# Patient Record
Sex: FEMALE | Race: WHITE | NOT HISPANIC OR LATINO | Employment: FULL TIME | URBAN - METROPOLITAN AREA
[De-identification: names, ages, dates, MRNs, and addresses within clinical notes are randomized per-mention and may not be internally consistent; named-entity substitution may affect disease eponyms.]

---

## 2017-10-04 ENCOUNTER — TRANSCRIBE ORDERS (OUTPATIENT)
Dept: ADMINISTRATIVE | Facility: HOSPITAL | Age: 57
End: 2017-10-04

## 2017-10-04 DIAGNOSIS — Z12.31 VISIT FOR SCREENING MAMMOGRAM: Primary | ICD-10-CM

## 2017-10-30 ENCOUNTER — HOSPITAL ENCOUNTER (OUTPATIENT)
Dept: MAMMOGRAPHY | Facility: MEDICAL CENTER | Age: 57
Discharge: HOME | End: 2017-10-30
Payer: COMMERCIAL

## 2017-10-30 DIAGNOSIS — Z12.31 VISIT FOR SCREENING MAMMOGRAM: ICD-10-CM

## 2017-10-30 PROCEDURE — G0202 SCR MAMMO BI INCL CAD: HCPCS

## 2019-04-11 ENCOUNTER — TRANSCRIBE ORDERS (OUTPATIENT)
Dept: ADMINISTRATIVE | Facility: HOSPITAL | Age: 59
End: 2019-04-11

## 2019-04-11 DIAGNOSIS — Z12.39 BREAST SCREENING, UNSPECIFIED: Primary | ICD-10-CM

## 2019-05-15 ENCOUNTER — HOSPITAL ENCOUNTER (OUTPATIENT)
Dept: MAMMOGRAPHY | Facility: MEDICAL CENTER | Age: 59
Discharge: HOME | End: 2019-05-15
Payer: COMMERCIAL

## 2019-05-15 VITALS — WEIGHT: 175 LBS | HEIGHT: 69 IN | BODY MASS INDEX: 25.92 KG/M2

## 2019-05-15 DIAGNOSIS — Z12.39 BREAST SCREENING, UNSPECIFIED: ICD-10-CM

## 2019-05-15 PROCEDURE — 77067 SCR MAMMO BI INCL CAD: CPT

## 2019-11-15 ENCOUNTER — OFFICE VISIT (OUTPATIENT)
Dept: CARDIOLOGY CLINIC | Facility: CLINIC | Age: 59
End: 2019-11-15
Payer: COMMERCIAL

## 2019-11-15 VITALS
SYSTOLIC BLOOD PRESSURE: 132 MMHG | HEART RATE: 63 BPM | HEIGHT: 69 IN | WEIGHT: 163.2 LBS | DIASTOLIC BLOOD PRESSURE: 84 MMHG | BODY MASS INDEX: 24.17 KG/M2

## 2019-11-15 DIAGNOSIS — E78.2 MIXED HYPERLIPIDEMIA: ICD-10-CM

## 2019-11-15 DIAGNOSIS — I10 BENIGN ESSENTIAL HYPERTENSION: Primary | ICD-10-CM

## 2019-11-15 PROBLEM — E03.9 HYPOTHYROIDISM: Status: ACTIVE | Noted: 2017-12-19

## 2019-11-15 PROCEDURE — 93000 ELECTROCARDIOGRAM COMPLETE: CPT | Performed by: INTERNAL MEDICINE

## 2019-11-15 PROCEDURE — 99204 OFFICE O/P NEW MOD 45 MIN: CPT | Performed by: INTERNAL MEDICINE

## 2019-11-15 NOTE — PATIENT INSTRUCTIONS
Sodium is probably the 1  Offender for hypertension  Saturated fat is a close 2nd  Caffeine probably less so than the above to causes  I am not opposed to medication discontinuation, but this depends on how aggressive your lifestyle is  If you do go with a more aggressive lifestyle option, and noticed her blood pressures are consistently under 499 systolic, would be reasonable to do a 2 or 3 week holiday off of losartan 50 mg, and reassess your blood pressures

## 2019-11-15 NOTE — PROGRESS NOTES
[facility]  Office Consultation  Geronimo Rivera Del Cid 61 y o  female MRN: [id number]        Problems    1  Benign essential hypertension  POCT ECG   2  Mixed hyperlipidemia         Impression:       Hypercholesterolemia  o She does have hypercholesterolemia, HDL 61, total 223,   o She does have family history of CAD, but not premature   o Normal stress test in 2016 at [facility]   o Atypical symptoms over the years, diagnosed with costochondritis  o ASCVD risk score 4%, does not need statin indications at this time  o Last cholesterol panel was also following an attempt at discontinuation of levothyroxine, which left her severely hypothyroid with a TSH of 17   o I would be interested in her cholesterol panel in the setting of normal thyroid function  o Routine labs typically assessed by her PCP   Hypertension  o She has showed excellent result with aggressive lifestyle modification, using the doctor Christopher Barnett detox approach  However, as is him in nature, she had sheets, this is exposed her to increased saturated fat and sodium, and with that rising blood pressures  o Current blood pressures are acceptable on losartan 50 milligrams  o She is no longer taking amlodipine   o Her EKG is normal, she has no LVH    Plan     Six-month follow-up   From a lifestyle modification standpoint, increased exercise, excellent sodium restriction, ideally with a goal of less than 2000, preferably less than 1500 mg per day, significant reduction in saturated fat, and last [place] if blood pressure still not well controlled, discontinuation of caffeine which is likely least implicated   Will continue to defer follow-up blood work to her PCP who does routinely   No cardiac testing needed for now  Reason for Consult / Principal Problem:  Assess cardiac risk factors    HPI: Deisy Nichols is a 61y o  year old female with a long history of hypertension and hypercholesterolemia    She does have family history of CAD, but not premature, and is estranged from her parents, left home at age 21  He is originally from [facility])  She is here to discuss hypertension primarily, but also addressed risk factors for heart disease, considering she has had atypical chest pain over the years, has had ischemic evaluations because of her family history, and has been told she has costochondritis, and stress related chest pain  Her last test was a stress echo in 2016, reportedly a normal stress test, she had no symptoms during the stress test, and her ejection fraction was normal   She is eager to get off medical therapy, she attempted the doctor Mirela Allred lifestyle approach, with an aggressive detox program which is almost non sustainable, and a did drastically reduce her blood pressure, to the point of hypotension, and the need to discontinue her medical therapy which at that time was losartan 100 milligrams and amlodipine 5 milligrams  Since then, with different periods of lifestyle modification, she has had fluctuations in her blood pressure, currently reasonable but not ideal on 50 milligrams of losartan  Blood pressures seem to be averaging 961-616 systolic  She does try to follow a low-sodium diet, she does try to follow a partial vegan diet        Review of Systems   Constitutional: Negative  HENT: Negative  Eyes: Negative  Respiratory: Negative  Cardiovascular: Positive for chest pain (atypcial)  Gastrointestinal: Negative  Endocrine: Negative  Genitourinary: Negative  Musculoskeletal: Negative  Skin: Negative  Neurological: Negative  Hematological: Negative  Psychiatric/Behavioral: Negative  No past medical history on file  No past surgical history on file    Social History     Substance and Sexual Activity   Alcohol Use Not on file     Social History     Substance and Sexual Activity   Drug Use Not on file     Social History     Tobacco Use   Smoking Status Never Smoker Smokeless Tobacco Never Used     Family History   Problem Relation Age of Onset    No Known Problems Sister        Allergies: Allergies   Allergen Reactions    Oxycodone-Acetaminophen Nausea Only     (Percocet) nauseated, no pain relief    Sulfamethoxazole-Trimethoprim Hives       Medications:     Current Outpatient Medications:     acetaminophen (TYLENOL) 500 mg tablet, Take 2 tablets by mouth, Disp: , Rfl:     ascorbic acid (VITAMIN C) 1000 MG tablet, Take 1,000 mg by mouth daily, Disp: , Rfl:     CALCIUM-MAGNESIUM PO, Take 3 tablets by mouth daily, Disp: , Rfl:     Cholecalciferol 1000 units tablet, Take 2 tablets by mouth daily, Disp: , Rfl:     cyanocobalamin (VITAMIN B-12) 500 mcg tablet, Take 500 mcg by mouth daily, Disp: , Rfl:     DHA-EPA--150-40 MG CAPS, Take by mouth, Disp: , Rfl:     Grape Seed Extract 60 MG CAPS, Take 1 capsule by mouth daily, Disp: , Rfl:     levothyroxine 50 mcg tablet, , Disp: , Rfl:     losartan (COZAAR) 100 MG tablet, , Disp: , Rfl:     Multiple Vitamins-Minerals (MULTIVITAMIN ADULT PO), 1 tab daily, Disp: , Rfl:     Potassium 99 MG TABS, Take 1 tablet by mouth daily, Disp: , Rfl:     Pyridoxine HCl (VITAMIN B-6) 100 MG TABS, Take 1 tablet by mouth daily, Disp: , Rfl:     Red Yeast Rice 600 MG CAPS, Take 1 capsule by mouth daily, Disp: , Rfl:     selenium 200 mcg, Take 200 mcg by mouth daily, Disp: , Rfl:     amLODIPine (NORVASC) 5 mg tablet, TAKE 1 TABLET DAILY, Disp: , Rfl:     loratadine (CLARITIN) 10 mg tablet, Take 10 mg by mouth daily, Disp: , Rfl:       Vitals:    11/15/19 0832   BP: 132/84   Pulse: 63     Weight (last 2 days)     Date/Time   Weight    11/15/19 0832   74 (163 2)            Physical Exam   Constitutional: She is oriented to person, place, and time  She appears well-developed and well-nourished  No distress  HENT:   Head: Normocephalic and atraumatic     Mouth/Throat: Oropharynx is clear and moist    Eyes: Pupils are equal, round, and reactive to light  Conjunctivae and EOM are normal  No scleral icterus  Neck: Normal range of motion  Neck supple  No tracheal deviation present  No thyromegaly present  Cardiovascular: Normal rate, regular rhythm, normal heart sounds and intact distal pulses  Exam reveals no gallop and no friction rub  No murmur heard  Pulmonary/Chest: Effort normal and breath sounds normal  No respiratory distress  She has no wheezes  She has no rales  Abdominal: Soft  Bowel sounds are normal  She exhibits no distension  There is no tenderness  Musculoskeletal: Normal range of motion  She exhibits no edema, tenderness or deformity  Neurological: She is alert and oriented to person, place, and time  No cranial nerve deficit  Skin: Skin is warm and dry  No rash noted  She is not diaphoretic  No erythema  Psychiatric: She has a normal mood and affect  Judgment normal          Laboratory Studies:    Laboratory studies personally reviewed    Cardiac testing:     EKG reviewed personally: Normal Sinus rhythm, normal ECG    Stress ECHO - 2016 - Normal      Fanta Nagel MD    Portions of the record may have been created with voice recognition software  Occasional wrong word or "sound a like" substitutions may have occurred due to the inherent limitations of voice recognition software  Read the chart carefully and recognize, using context, where substitutions have occurred

## 2020-05-15 ENCOUNTER — TELEPHONE (OUTPATIENT)
Dept: CARDIOLOGY CLINIC | Facility: CLINIC | Age: 60
End: 2020-05-15

## 2020-05-15 DIAGNOSIS — E78.2 MIXED HYPERLIPIDEMIA: Primary | ICD-10-CM

## 2020-05-15 DIAGNOSIS — I10 BENIGN ESSENTIAL HYPERTENSION: ICD-10-CM

## 2020-05-26 ENCOUNTER — TELEPHONE (OUTPATIENT)
Dept: CARDIOLOGY CLINIC | Facility: CLINIC | Age: 60
End: 2020-05-26

## 2020-05-27 ENCOUNTER — OFFICE VISIT (OUTPATIENT)
Dept: CARDIOLOGY CLINIC | Facility: CLINIC | Age: 60
End: 2020-05-27
Payer: COMMERCIAL

## 2020-05-27 VITALS
HEIGHT: 69 IN | HEART RATE: 74 BPM | SYSTOLIC BLOOD PRESSURE: 124 MMHG | WEIGHT: 164.9 LBS | DIASTOLIC BLOOD PRESSURE: 78 MMHG | BODY MASS INDEX: 24.42 KG/M2 | TEMPERATURE: 97.8 F

## 2020-05-27 DIAGNOSIS — I10 BENIGN ESSENTIAL HYPERTENSION: ICD-10-CM

## 2020-05-27 DIAGNOSIS — E78.2 MIXED HYPERLIPIDEMIA: Primary | ICD-10-CM

## 2020-05-27 PROCEDURE — 99214 OFFICE O/P EST MOD 30 MIN: CPT | Performed by: INTERNAL MEDICINE

## 2020-06-05 ENCOUNTER — HOSPITAL ENCOUNTER (OUTPATIENT)
Dept: CT IMAGING | Facility: HOSPITAL | Age: 60
Discharge: HOME | End: 2020-06-05
Attending: INTERNAL MEDICINE
Payer: COMMERCIAL

## 2020-06-05 DIAGNOSIS — E78.2 MIXED HYPERLIPIDEMIA: ICD-10-CM

## 2021-03-10 DIAGNOSIS — Z23 ENCOUNTER FOR IMMUNIZATION: ICD-10-CM

## 2021-03-31 ENCOUNTER — IMMUNIZATIONS (OUTPATIENT)
Dept: FAMILY MEDICINE CLINIC | Facility: HOSPITAL | Age: 61
End: 2021-03-31

## 2021-03-31 DIAGNOSIS — Z23 ENCOUNTER FOR IMMUNIZATION: Primary | ICD-10-CM

## 2021-03-31 PROCEDURE — 0001A SARS-COV-2 / COVID-19 MRNA VACCINE (PFIZER-BIONTECH) 30 MCG: CPT

## 2021-03-31 PROCEDURE — 91300 SARS-COV-2 / COVID-19 MRNA VACCINE (PFIZER-BIONTECH) 30 MCG: CPT

## 2021-04-23 ENCOUNTER — IMMUNIZATIONS (OUTPATIENT)
Dept: FAMILY MEDICINE CLINIC | Facility: HOSPITAL | Age: 61
End: 2021-04-23

## 2021-04-23 DIAGNOSIS — Z23 ENCOUNTER FOR IMMUNIZATION: Primary | ICD-10-CM

## 2021-04-23 PROCEDURE — 0002A SARS-COV-2 / COVID-19 MRNA VACCINE (PFIZER-BIONTECH) 30 MCG: CPT

## 2021-04-23 PROCEDURE — 91300 SARS-COV-2 / COVID-19 MRNA VACCINE (PFIZER-BIONTECH) 30 MCG: CPT

## 2021-05-10 ENCOUNTER — OFFICE VISIT (OUTPATIENT)
Dept: CARDIOLOGY CLINIC | Facility: CLINIC | Age: 61
End: 2021-05-10
Payer: COMMERCIAL

## 2021-05-10 VITALS
WEIGHT: 175.7 LBS | SYSTOLIC BLOOD PRESSURE: 130 MMHG | HEIGHT: 69 IN | BODY MASS INDEX: 26.02 KG/M2 | HEART RATE: 66 BPM | DIASTOLIC BLOOD PRESSURE: 82 MMHG

## 2021-05-10 DIAGNOSIS — R00.0 TACHYCARDIA: ICD-10-CM

## 2021-05-10 DIAGNOSIS — I10 BENIGN ESSENTIAL HYPERTENSION: ICD-10-CM

## 2021-05-10 DIAGNOSIS — R53.83 OTHER FATIGUE: ICD-10-CM

## 2021-05-10 DIAGNOSIS — E78.2 MIXED HYPERLIPIDEMIA: Primary | ICD-10-CM

## 2021-05-10 PROCEDURE — 1036F TOBACCO NON-USER: CPT | Performed by: INTERNAL MEDICINE

## 2021-05-10 PROCEDURE — 93000 ELECTROCARDIOGRAM COMPLETE: CPT | Performed by: INTERNAL MEDICINE

## 2021-05-10 PROCEDURE — 99214 OFFICE O/P EST MOD 30 MIN: CPT | Performed by: INTERNAL MEDICINE

## 2021-05-10 PROCEDURE — 3008F BODY MASS INDEX DOCD: CPT | Performed by: INTERNAL MEDICINE

## 2021-05-10 NOTE — PROGRESS NOTES
[facility]  Follow up note  Gemini Lombardi 64 y o  female MRN: [id number]        Problems    1  Mixed hyperlipidemia  Lipid panel   2  Benign essential hypertension  POCT ECG   3  Other fatigue  AMB extended holter monitor   4  Tachycardia         Impression:      tachycardia/fatigue   o Noted on apple watch, especially surrounding exercise, although no specific exertional symptoms  o Noted the changes after getting her 2nd COVID vanc seen   Hypertension   o  under excellent control    hypothyroidism  o  TSH levels have normalized since 2019 when there were us high as 16   Hyperlipidemia  o  traditional ASCVD risk is low at 5%   o Total cholesterol down to 206, LDL down to 123   o Near vegan diet  And primary goal is healthy lifestyle   o Despite coronary calcium of 125, I think it is still reasonable to proceed with lifestyle modification as a primary treatment strategy    Plan:       Proceed with 1 week ZIO patch   Check lipids    Continue annual primary prevention risk assessment visits    HPI:   Gemini Lombardi is a 64y o  year old female without  Premature family history of CAD, hypertension, mild hypercholesterolemia, hypothyroidism, returns to see me mostly with complaints at this time of note in that her apple watch has heart rates up to 170-180 especially with exercise although in the absence of significant exertional symptoms, and noted especially since having her COVID shots  She has fatigue that is out of proportion to her normal fatigue she states  She has been off of her vitamins for the last 1 year  Her blood pressure is excellent  Cholesterol is improved  To 206 for total, 126 for LDL, she continues to strive by healthy lifestyle, walks frequently although not very long distance, and is trying to attain a near  vegan diet  She denies any other significant cardiac symptoms  Review of Systems   Constitutional: Positive for fatigue   Negative for appetite change, diaphoresis and fever  Respiratory: Negative for chest tightness, shortness of breath and wheezing  Cardiovascular: Negative for chest pain, palpitations and leg swelling  Gastrointestinal: Negative for abdominal pain and blood in stool  Musculoskeletal: Negative for arthralgias and joint swelling  Skin: Negative for rash  Neurological: Negative for dizziness, syncope and light-headedness  No past medical history on file  Social History     Substance and Sexual Activity   Alcohol Use None     Social History     Substance and Sexual Activity   Drug Use Not on file     Social History     Tobacco Use   Smoking Status Never Smoker   Smokeless Tobacco Never Used       Allergies:   Allergies   Allergen Reactions    Oxycodone-Acetaminophen Nausea Only     (Percocet) nauseated, no pain relief    Sulfamethoxazole-Trimethoprim Hives       Medications:     Current Outpatient Medications:     ascorbic acid (VITAMIN C) 1000 MG tablet, Take 1,000 mg by mouth daily, Disp: , Rfl:     CALCIUM-MAGNESIUM PO, Take 3 tablets by mouth daily, Disp: , Rfl:     Cholecalciferol 1000 units tablet, Take 2 tablets by mouth daily, Disp: , Rfl:     cyanocobalamin (VITAMIN B-12) 500 mcg tablet, Take 500 mcg by mouth daily, Disp: , Rfl:     Grape Seed Extract 60 MG CAPS, Take 1 capsule by mouth daily, Disp: , Rfl:     IODINE-VITAMIN A PO, , Disp: , Rfl:     levothyroxine 50 mcg tablet, , Disp: , Rfl:     losartan (COZAAR) 100 MG tablet, Take 50 mg by mouth daily, Disp: , Rfl:     Potassium 99 MG TABS, Take 1 tablet by mouth daily, Disp: , Rfl:     Pyridoxine HCl (VITAMIN B-6) 100 MG TABS, Take 1 tablet by mouth daily, Disp: , Rfl:     Red Yeast Rice 600 MG CAPS, Take 1 capsule by mouth daily, Disp: , Rfl:     selenium 200 mcg, Take 200 mcg by mouth daily, Disp: , Rfl:       Vitals:    05/10/21 0942   BP: 130/82   Pulse: 66     Weight (last 2 days)     Date/Time   Weight    05/10/21 0942   79 7 (175 7)            Physical Exam  Constitutional:       General: She is not in acute distress  Appearance: She is not diaphoretic  HENT:      Head: Normocephalic and atraumatic  Eyes:      General: No scleral icterus  Conjunctiva/sclera: Conjunctivae normal    Neck:      Musculoskeletal: Normal range of motion  Vascular: No JVD  Cardiovascular:      Rate and Rhythm: Normal rate and regular rhythm  Heart sounds: Normal heart sounds  No murmur  Pulmonary:      Effort: Pulmonary effort is normal  No respiratory distress  Breath sounds: Normal breath sounds  No wheezing, rhonchi or rales  Musculoskeletal:         General: No tenderness  Right lower leg: Normal  No edema  Left lower leg: Normal  No edema  Skin:     General: Skin is warm and dry  Laboratory Studies:     all laboratory studies personally reviewed    Cardiac testing:     EKG reviewed personally:    11/19-normal sinus rhythm, normal EKG  Results for orders placed or performed in visit on 05/10/21   POCT ECG    Impression    Normal sinus rhythm, normal EKG          stress test  [facility] 2016-normal stress echo      Suzi Ortega MD    Portions of the record may have been created with voice recognition software  Occasional wrong word or "sound a like" substitutions may have occurred due to the inherent limitations of voice recognition software  Read the chart carefully and recognize, using context, where substitutions have occurred

## 2021-05-12 ENCOUNTER — APPOINTMENT (OUTPATIENT)
Dept: LAB | Facility: CLINIC | Age: 61
End: 2021-05-12
Payer: COMMERCIAL

## 2021-05-12 DIAGNOSIS — E78.2 MIXED HYPERLIPIDEMIA: ICD-10-CM

## 2021-05-12 LAB
ALBUMIN SERPL BCP-MCNC: 3.8 G/DL (ref 3.5–5)
ALP SERPL-CCNC: 60 U/L (ref 46–116)
ALT SERPL W P-5'-P-CCNC: 21 U/L (ref 12–78)
ANION GAP SERPL CALCULATED.3IONS-SCNC: 5 MMOL/L (ref 4–13)
AST SERPL W P-5'-P-CCNC: 14 U/L (ref 5–45)
BILIRUB SERPL-MCNC: 0.62 MG/DL (ref 0.2–1)
BUN SERPL-MCNC: 11 MG/DL (ref 5–25)
CALCIUM SERPL-MCNC: 9.6 MG/DL (ref 8.3–10.1)
CHLORIDE SERPL-SCNC: 111 MMOL/L (ref 100–108)
CHOLEST SERPL-MCNC: 202 MG/DL (ref 50–200)
CO2 SERPL-SCNC: 25 MMOL/L (ref 21–32)
CREAT SERPL-MCNC: 0.64 MG/DL (ref 0.6–1.3)
GFR SERPL CREATININE-BSD FRML MDRD: 97 ML/MIN/1.73SQ M
GLUCOSE P FAST SERPL-MCNC: 100 MG/DL (ref 65–99)
HDLC SERPL-MCNC: 54 MG/DL
LDLC SERPL CALC-MCNC: 121 MG/DL (ref 0–100)
POTASSIUM SERPL-SCNC: 3.5 MMOL/L (ref 3.5–5.3)
PROT SERPL-MCNC: 7.2 G/DL (ref 6.4–8.2)
SODIUM SERPL-SCNC: 141 MMOL/L (ref 136–145)
TRIGL SERPL-MCNC: 133 MG/DL

## 2021-05-12 PROCEDURE — 80061 LIPID PANEL: CPT

## 2021-05-12 PROCEDURE — 80053 COMPREHEN METABOLIC PANEL: CPT

## 2021-05-12 PROCEDURE — 36415 COLL VENOUS BLD VENIPUNCTURE: CPT

## 2021-05-31 ENCOUNTER — APPOINTMENT (EMERGENCY)
Dept: CT IMAGING | Facility: HOSPITAL | Age: 61
End: 2021-05-31
Payer: COMMERCIAL

## 2021-05-31 ENCOUNTER — HOSPITAL ENCOUNTER (EMERGENCY)
Facility: HOSPITAL | Age: 61
Discharge: HOME | End: 2021-05-31
Attending: EMERGENCY MEDICINE | Admitting: EMERGENCY MEDICINE
Payer: COMMERCIAL

## 2021-05-31 VITALS
DIASTOLIC BLOOD PRESSURE: 82 MMHG | TEMPERATURE: 98.8 F | OXYGEN SATURATION: 99 % | RESPIRATION RATE: 18 BRPM | WEIGHT: 168 LBS | HEIGHT: 68 IN | BODY MASS INDEX: 25.46 KG/M2 | SYSTOLIC BLOOD PRESSURE: 167 MMHG | HEART RATE: 59 BPM

## 2021-05-31 DIAGNOSIS — R10.9 ABDOMINAL PAIN: Primary | ICD-10-CM

## 2021-05-31 DIAGNOSIS — N28.1 CYST OF LEFT KIDNEY: ICD-10-CM

## 2021-05-31 DIAGNOSIS — R31.9 HEMATURIA: ICD-10-CM

## 2021-05-31 LAB
ALBUMIN SERPL BCP-MCNC: 4.1 G/DL (ref 3.5–5)
ALP SERPL-CCNC: 54 U/L (ref 46–116)
ALT SERPL W P-5'-P-CCNC: 21 U/L (ref 12–78)
ANION GAP SERPL CALCULATED.3IONS-SCNC: 10 MMOL/L (ref 4–13)
AST SERPL W P-5'-P-CCNC: 15 U/L (ref 5–45)
BACTERIA UR QL AUTO: NORMAL /HPF
BASOPHILS # BLD AUTO: 0.05 THOUSANDS/ΜL (ref 0–0.1)
BASOPHILS NFR BLD AUTO: 1 % (ref 0–1)
BILIRUB SERPL-MCNC: 0.38 MG/DL (ref 0.2–1)
BILIRUB UR QL STRIP: NEGATIVE
BUN SERPL-MCNC: 11 MG/DL (ref 5–25)
CALCIUM SERPL-MCNC: 9.7 MG/DL (ref 8.3–10.1)
CHLORIDE SERPL-SCNC: 107 MMOL/L (ref 100–108)
CLARITY UR: CLEAR
CO2 SERPL-SCNC: 25 MMOL/L (ref 21–32)
COLOR UR: YELLOW
CREAT SERPL-MCNC: 0.87 MG/DL (ref 0.6–1.3)
EOSINOPHIL # BLD AUTO: 0.08 THOUSAND/ΜL (ref 0–0.61)
EOSINOPHIL NFR BLD AUTO: 1 % (ref 0–6)
ERYTHROCYTE [DISTWIDTH] IN BLOOD BY AUTOMATED COUNT: 13.1 % (ref 11.6–15.1)
GFR SERPL CREATININE-BSD FRML MDRD: 72 ML/MIN/1.73SQ M
GLUCOSE SERPL-MCNC: 116 MG/DL (ref 65–140)
GLUCOSE UR STRIP-MCNC: NEGATIVE MG/DL
HCT VFR BLD AUTO: 43.1 % (ref 34.8–46.1)
HGB BLD-MCNC: 14.4 G/DL (ref 11.5–15.4)
HGB UR QL STRIP.AUTO: ABNORMAL
IMM GRANULOCYTES # BLD AUTO: 0.01 THOUSAND/UL (ref 0–0.2)
IMM GRANULOCYTES NFR BLD AUTO: 0 % (ref 0–2)
KETONES UR STRIP-MCNC: NEGATIVE MG/DL
LEUKOCYTE ESTERASE UR QL STRIP: NEGATIVE
LIPASE SERPL-CCNC: 78 U/L (ref 73–393)
LYMPHOCYTES # BLD AUTO: 1.5 THOUSANDS/ΜL (ref 0.6–4.47)
LYMPHOCYTES NFR BLD AUTO: 25 % (ref 14–44)
MCH RBC QN AUTO: 31.1 PG (ref 26.8–34.3)
MCHC RBC AUTO-ENTMCNC: 33.4 G/DL (ref 31.4–37.4)
MCV RBC AUTO: 93 FL (ref 82–98)
MONOCYTES # BLD AUTO: 0.35 THOUSAND/ΜL (ref 0.17–1.22)
MONOCYTES NFR BLD AUTO: 6 % (ref 4–12)
NEUTROPHILS # BLD AUTO: 3.94 THOUSANDS/ΜL (ref 1.85–7.62)
NEUTS SEG NFR BLD AUTO: 67 % (ref 43–75)
NITRITE UR QL STRIP: NEGATIVE
NON-SQ EPI CELLS URNS QL MICRO: NORMAL /HPF
NRBC BLD AUTO-RTO: 0 /100 WBCS
PH UR STRIP.AUTO: 7 [PH] (ref 4.5–8)
PLATELET # BLD AUTO: 224 THOUSANDS/UL (ref 149–390)
PMV BLD AUTO: 10.2 FL (ref 8.9–12.7)
POTASSIUM SERPL-SCNC: 4 MMOL/L (ref 3.5–5.3)
PROT SERPL-MCNC: 7.5 G/DL (ref 6.4–8.2)
PROT UR STRIP-MCNC: NEGATIVE MG/DL
RBC # BLD AUTO: 4.63 MILLION/UL (ref 3.81–5.12)
RBC #/AREA URNS AUTO: NORMAL /HPF
SODIUM SERPL-SCNC: 142 MMOL/L (ref 136–145)
SP GR UR STRIP.AUTO: 1.01 (ref 1–1.03)
UROBILINOGEN UR QL STRIP.AUTO: 0.2 E.U./DL
WBC # BLD AUTO: 5.93 THOUSAND/UL (ref 4.31–10.16)
WBC #/AREA URNS AUTO: NORMAL /HPF

## 2021-05-31 PROCEDURE — 74177 CT ABD & PELVIS W/CONTRAST: CPT

## 2021-05-31 PROCEDURE — 99282 EMERGENCY DEPT VISIT SF MDM: CPT | Performed by: PHYSICIAN ASSISTANT

## 2021-05-31 PROCEDURE — 83690 ASSAY OF LIPASE: CPT | Performed by: EMERGENCY MEDICINE

## 2021-05-31 PROCEDURE — 36415 COLL VENOUS BLD VENIPUNCTURE: CPT

## 2021-05-31 PROCEDURE — 80053 COMPREHEN METABOLIC PANEL: CPT | Performed by: EMERGENCY MEDICINE

## 2021-05-31 PROCEDURE — G1004 CDSM NDSC: HCPCS

## 2021-05-31 PROCEDURE — 99284 EMERGENCY DEPT VISIT MOD MDM: CPT

## 2021-05-31 PROCEDURE — 85025 COMPLETE CBC W/AUTO DIFF WBC: CPT | Performed by: EMERGENCY MEDICINE

## 2021-05-31 PROCEDURE — 81001 URINALYSIS AUTO W/SCOPE: CPT

## 2021-05-31 RX ADMIN — IOHEXOL 100 ML: 350 INJECTION, SOLUTION INTRAVENOUS at 15:17

## 2021-05-31 NOTE — ED PROVIDER NOTES
History  Chief Complaint   Patient presents with    Abdominal Pain      c/o intermittent sharp shooting RLQ abd pain starting yesterday, -n,v,d     The patient is a 60-year-old female with no significant past medical history who presents to the emergency department for evaluation of right lower quadrant abdominal pain  The patient states that the pain started yesterday and was intermittent in nature  She states she had about 5-6 episodes of sudden onset sharp pain in her right lower quadrant  She states that she was able to sleep well last night, however she felt that the pain worsened this morning and became more frequent  She reports since being in the ED, the pain has somewhat subsided  The patient does not have any previous history of surgeries on her lower abdomen  She states that she took Pepto-Bismol and Motrin last night for the symptoms, and it did help  She did not take anything for the pain yet today  She denies fever, chills, constipation, nausea, vomiting, diarrhea, dysuria, hematuria or vaginal discharge  History provided by:  Patient   used: No    Abdominal Pain  Associated symptoms: no chest pain, no chills, no cough, no diarrhea, no dysuria, no fever, no hematuria, no nausea, no shortness of breath, no sore throat and no vomiting        Prior to Admission Medications   Prescriptions Last Dose Informant Patient Reported? Taking?    CALCIUM-MAGNESIUM PO  Self Yes No   Sig: Take 3 tablets by mouth daily   Cholecalciferol 1000 units tablet  Self Yes No   Sig: Take 2 tablets by mouth daily   Grape Seed Extract 60 MG CAPS  Self Yes No   Sig: Take 1 capsule by mouth daily   IODINE-VITAMIN A PO  Self Yes No   Potassium 99 MG TABS  Self Yes No   Sig: Take 1 tablet by mouth daily   Pyridoxine HCl (VITAMIN B-6) 100 MG TABS  Self Yes No   Sig: Take 1 tablet by mouth daily   Red Yeast Rice 600 MG CAPS  Self Yes No   Sig: Take 1 capsule by mouth daily   ascorbic acid (VITAMIN C) 1000 MG tablet  Self Yes No   Sig: Take 1,000 mg by mouth daily   cyanocobalamin (VITAMIN B-12) 500 mcg tablet  Self Yes No   Sig: Take 500 mcg by mouth daily   levothyroxine 50 mcg tablet  Self Yes No   losartan (COZAAR) 100 MG tablet  Self Yes No   Sig: Take 50 mg by mouth daily   selenium 200 mcg  Self Yes No   Sig: Take 200 mcg by mouth daily      Facility-Administered Medications: None       History reviewed  No pertinent past medical history  History reviewed  No pertinent surgical history  Family History   Problem Relation Age of Onset    No Known Problems Sister      I have reviewed and agree with the history as documented  E-Cigarette/Vaping     E-Cigarette/Vaping Substances     Social History     Tobacco Use    Smoking status: Never Smoker    Smokeless tobacco: Never Used   Substance Use Topics    Alcohol use: Yes     Frequency: Monthly or less     Drinks per session: 1 or 2     Binge frequency: Less than monthly    Drug use: Never       Review of Systems   Constitutional: Negative for chills and fever  HENT: Negative for ear pain and sore throat  Eyes: Negative for redness and visual disturbance  Respiratory: Negative for cough and shortness of breath  Cardiovascular: Negative for chest pain  Gastrointestinal: Positive for abdominal pain  Negative for diarrhea, nausea and vomiting  Genitourinary: Negative for dysuria and hematuria  Musculoskeletal: Negative for back pain, neck pain and neck stiffness  Skin: Negative for color change and rash  Neurological: Negative for dizziness, light-headedness and headaches  All other systems reviewed and are negative  Physical Exam  Physical Exam  Vitals signs and nursing note reviewed  Constitutional:       General: She is not in acute distress  Appearance: She is well-developed  She is not ill-appearing or toxic-appearing  HENT:      Head: Normocephalic and atraumatic  Mouth/Throat:      Pharynx: Uvula midline  Eyes:      General: Lids are normal       Conjunctiva/sclera: Conjunctivae normal    Neck:      Musculoskeletal: Normal range of motion and neck supple  Cardiovascular:      Rate and Rhythm: Normal rate and regular rhythm  Heart sounds: Normal heart sounds  Pulmonary:      Effort: Pulmonary effort is normal       Breath sounds: Normal breath sounds  Abdominal:      General: There is no distension  Palpations: Abdomen is soft  Tenderness: There is abdominal tenderness in the right lower quadrant  Skin:     General: Skin is warm and dry  Neurological:      Mental Status: She is alert and oriented to person, place, and time           Vital Signs  ED Triage Vitals [05/31/21 1306]   Temperature Pulse Respirations Blood Pressure SpO2   98 8 °F (37 1 °C) 74 18 170/80 98 %      Temp Source Heart Rate Source Patient Position - Orthostatic VS BP Location FiO2 (%)   Oral Monitor Lying Left arm --      Pain Score       5           Vitals:    05/31/21 1306 05/31/21 1530   BP: 170/80 167/82   Pulse: 74 59   Patient Position - Orthostatic VS: Lying Lying         Visual Acuity      ED Medications  Medications   iohexol (OMNIPAQUE) 350 MG/ML injection (SINGLE-DOSE) 100 mL (100 mL Intravenous Given 5/31/21 1517)       Diagnostic Studies  Results Reviewed     Procedure Component Value Units Date/Time    Urine Microscopic [[id number]]  (Normal) Collected: 05/31/21 1500    Lab Status: Final result Specimen: Urine, Clean Catch Updated: 05/31/21 1523     RBC, UA 0-1 /hpf      WBC, UA None Seen /hpf      Epithelial Cells None Seen /hpf      Bacteria, UA None Seen /hpf     Urine Macroscopic, POC [[id number]]  (Abnormal) Collected: 05/31/21 1500    Lab Status: Final result Specimen: Urine Updated: 05/31/21 1501     Color, UA Yellow     Clarity, UA Clear     pH, UA 7 0     Leukocytes, UA Negative     Nitrite, UA Negative     Protein, UA Negative mg/dl      Glucose, UA Negative mg/dl      Ketones, UA Negative mg/dl Urobilinogen, UA 0 2 E U /dl      Bilirubin, UA Negative     Blood, UA Small     Specific [place], UA 1 015    Narrative:      CLINITEK RESULT    Comprehensive metabolic panel [[id number]] Collected: 05/31/21 1309    Lab Status: Final result Specimen: Blood from Arm, Right Updated: 05/31/21 1332     Sodium 142 mmol/L      Potassium 4 0 mmol/L      Chloride 107 mmol/L      CO2 25 mmol/L      ANION GAP 10 mmol/L      BUN 11 mg/dL      Creatinine 0 87 mg/dL      Glucose 116 mg/dL      Calcium 9 7 mg/dL      AST 15 U/L      ALT 21 U/L      Alkaline Phosphatase 54 U/L      Total Protein 7 5 g/dL      Albumin 4 1 g/dL      Total Bilirubin 0 38 mg/dL      eGFR 72 ml/min/1 73sq m     Narrative:      National Kidney Disease Foundation guidelines for Chronic Kidney Disease (CKD):     Stage 1 with normal or high GFR (GFR > 90 mL/min/1 73 square meters)    Stage 2 Mild CKD (GFR = 60-89 mL/min/1 73 square meters)    Stage 3A Moderate CKD (GFR = 45-59 mL/min/1 73 square meters)    Stage 3B Moderate CKD (GFR = 30-44 mL/min/1 73 square meters)    Stage 4 Severe CKD (GFR = 15-29 mL/min/1 73 square meters)    Stage 5 End Stage CKD (GFR <15 mL/min/1 73 square meters)  Note: GFR calculation is accurate only with a steady state creatinine    Lipase [[id number]]  (Normal) Collected: 05/31/21 1309    Lab Status: Final result Specimen: Blood from Arm, Right Updated: 05/31/21 1332     Lipase 78 u/L     CBC and differential [[id number]] Collected: 05/31/21 1309    Lab Status: Final result Specimen: Blood from Arm, Right Updated: 05/31/21 1320     WBC 5 93 Thousand/uL      RBC 4 63 Million/uL      Hemoglobin 14 4 g/dL      Hematocrit 43 1 %      MCV 93 fL      MCH 31 1 pg      MCHC 33 4 g/dL      RDW 13 1 %      MPV 10 2 fL      Platelets 865 Thousands/uL      nRBC 0 /100 WBCs      Neutrophils Relative 67 %      Immat GRANS % 0 %      Lymphocytes Relative 25 %      Monocytes Relative 6 %      Eosinophils Relative 1 %      Basophils Relative 1 %      Neutrophils Absolute 3 94 Thousands/µL      Immature Grans Absolute 0 01 Thousand/uL      Lymphocytes Absolute 1 50 Thousands/µL      Monocytes Absolute 0 35 Thousand/µL      Eosinophils Absolute 0 08 Thousand/µL      Basophils Absolute 0 05 Thousands/µL                  CT abdomen pelvis with contrast   Final Result by Jolie Lua MD (05/31 1547)      No acute intra-abdominal pathology  Specifically, no evidence for acute appendicitis  Workstation performed: [id number]                    Procedures  Procedures         ED Course  ED Course as of May 31 2049   Mon May 31, 2021   1547 Patient is sleeping and resting comfortably  Pending CT results  SBIRT 22yo+      Most Recent Value   SBIRT (24 yo +)   In order to provide better care to our patients, we are screening all of our patients for alcohol and drug use  Would it be okay to ask you these screening questions? Yes Filed at: 05/31/2021 1422   Initial Alcohol Screen: US AUDIT-C    1  How often do you have a drink containing alcohol? 1 Filed at: 05/31/2021 1422   2  How many drinks containing alcohol do you have on a typical day you are drinking? 1 Filed at: 05/31/2021 1422   3a  Male UNDER 65: How often do you have five or more drinks on one occasion? 0 Filed at: 05/31/2021 1422   3b  FEMALE Any Age, or MALE 65+: How often do you have 4 or more drinks on one occassion? 0 Filed at: 05/31/2021 1422   Audit-C Score  2 Filed at: 05/31/2021 1422   SARA: How many times in the past year have you    Used an illegal drug or used a prescription medication for non-medical reasons?   Never Filed at: 05/31/2021 1422                    MDM  Number of Diagnoses or Management Options  Abdominal pain: new and requires workup  Cyst of left kidney: new and requires workup  Hematuria: new and requires workup  Diagnosis management comments: Patient presents for evaluation of right lower quadrant abdominal pain     Differential includes but is not limited to appendicitis versus pyelonephritis versus kidney stone versus ovarian torsion versus ovarian cyst versus diverticulitis versus bowel obstruction  Labs and imaging ordered  Patient declines anything for pain in the ED  Labs reviewed with no significant findings  UA is not concerning for infection at this time  There is trace blood in urine  Results were discussed with the patient  There were no acute findings on CT  Patient was made aware of incidental finding of cyst on left kidney  I advised speaking with her primary care doctor about this as they may want to just monitor for growth  High suspicion for musculoskeletal pain at this time  I advised the patient to take Tylenol or Motrin over the next couple of days  I advised if she is continuing to have pain after 2-3 days, she should follow up with her primary care doctor for further evaluation  I advised that if she has any new or significantly worsening symptoms, she should return to the emergency department  She acknowledged understanding  Additionally, I advised she follow up with her primary care doctor to ensure resolution of hematuria  Patient is stable for discharge         Amount and/or Complexity of Data Reviewed  Clinical lab tests: ordered and reviewed  Tests in the radiology section of CPT®: ordered and reviewed  Decide to obtain previous medical records or to obtain history from someone other than the patient: yes  Review and summarize past medical records: yes    Risk of Complications, Morbidity, and/or Mortality  Presenting problems: low  Diagnostic procedures: low  Management options: low    Patient Progress  Patient progress: stable      Disposition  Final diagnoses:   Abdominal pain   Hematuria   Cyst of left kidney     Time reflects when diagnosis was documented in both MDM as applicable and the Disposition within this note     Time User Action Codes Description Comment    5/31/2021  3:54 PM Viviana Royalty Add [R10 9] Abdominal pain     5/31/2021  3:54 PM Viviana Royalty Add [R31 9] Hematuria     5/31/2021  3:54 PM Viviana Warrenty Add [N28 1] Cyst of left kidney       ED Disposition     ED Disposition Condition Date/Time Comment    Discharge Stable Mon May 31, 2021  3:54 PM Skylar Black Del Cid discharge to home/self care              Follow-up Information     Follow up With Specialties Details Why Contact Info Additional Information    Linda Muhammad,  Internal Medicine Schedule an appointment as soon as possible for a visit in 2 days  [facility] [id number]  [facility] Emergency [facility]  If symptoms worsen [address] Emergency Department, [address], [place], [place], [address]          Discharge Medication List as of 5/31/2021  3:55 PM      CONTINUE these medications which have NOT CHANGED    Details   ascorbic acid (VITAMIN C) 1000 MG tablet Take 1,000 mg by mouth daily, Historical Med      CALCIUM-MAGNESIUM PO Take 3 tablets by mouth daily, Starting Tue 12/16/2008, Historical Med      Cholecalciferol 1000 units tablet Take 2 tablets by mouth daily, Starting Tue 3/26/2013, Historical Med      cyanocobalamin (VITAMIN B-12) 500 mcg tablet Take 500 mcg by mouth daily, Historical Med      Grape Seed Extract 60 MG CAPS Take 1 capsule by mouth daily, Starting Tue 12/16/2008, Historical Med      IODINE-VITAMIN A PO Starting Mon 1/1/2018, Historical Med      levothyroxine 50 mcg tablet Historical Med      losartan (COZAAR) 100 MG tablet Take 50 mg by mouth daily, Starting Wed 4/17/2019, Historical Med      Potassium 99 MG TABS Take 1 tablet by mouth daily, Starting Fri 9/7/2012, Historical Med      Pyridoxine HCl (VITAMIN B-6) 100 MG TABS Take 1 tablet by mouth daily, Starting Tue 3/26/2013, Historical Med      Red Yeast Rice 600 MG CAPS Take 1 capsule by mouth daily, Starting Tue 12/16/2008, Historical Med      selenium 200 mcg Take 200 mcg by mouth daily, Historical Med           No discharge procedures on file      PDMP Review     None          ED Provider  Electronically Signed by           Anayeli Kaba PA-C  05/31/21 2049

## 2021-06-09 ENCOUNTER — CLINICAL SUPPORT (OUTPATIENT)
Dept: CARDIOLOGY CLINIC | Facility: CLINIC | Age: 61
End: 2021-06-09
Payer: COMMERCIAL

## 2021-06-09 DIAGNOSIS — R00.0 TACHYCARDIA: ICD-10-CM

## 2021-06-09 DIAGNOSIS — R53.83 OTHER FATIGUE: ICD-10-CM

## 2021-06-09 PROCEDURE — 93244 EXT ECG>48HR<7D REV&INTERPJ: CPT | Performed by: INTERNAL MEDICINE

## 2022-01-12 ENCOUNTER — HOSPITAL ENCOUNTER (OUTPATIENT)
Dept: MAMMOGRAPHY | Facility: MEDICAL CENTER | Age: 62
Discharge: HOME | End: 2022-01-12
Payer: COMMERCIAL

## 2022-01-12 VITALS — HEIGHT: 68 IN | WEIGHT: 167.99 LBS | BODY MASS INDEX: 25.46 KG/M2

## 2022-01-12 DIAGNOSIS — Z12.31 ENCOUNTER FOR SCREENING MAMMOGRAM FOR MALIGNANT NEOPLASM OF BREAST: ICD-10-CM

## 2022-01-12 PROCEDURE — 77063 BREAST TOMOSYNTHESIS BI: CPT

## 2022-01-12 PROCEDURE — 77067 SCR MAMMO BI INCL CAD: CPT

## 2022-05-18 ENCOUNTER — OFFICE VISIT (OUTPATIENT)
Dept: CARDIOLOGY CLINIC | Facility: CLINIC | Age: 62
End: 2022-05-18
Payer: COMMERCIAL

## 2022-05-18 VITALS
DIASTOLIC BLOOD PRESSURE: 92 MMHG | SYSTOLIC BLOOD PRESSURE: 130 MMHG | HEIGHT: 68 IN | HEART RATE: 74 BPM | WEIGHT: 179 LBS | BODY MASS INDEX: 27.13 KG/M2

## 2022-05-18 DIAGNOSIS — E78.2 MIXED HYPERLIPIDEMIA: Primary | ICD-10-CM

## 2022-05-18 DIAGNOSIS — I10 BENIGN ESSENTIAL HYPERTENSION: ICD-10-CM

## 2022-05-18 PROCEDURE — 99214 OFFICE O/P EST MOD 30 MIN: CPT | Performed by: INTERNAL MEDICINE

## 2022-05-18 PROCEDURE — 93000 ELECTROCARDIOGRAM COMPLETE: CPT | Performed by: INTERNAL MEDICINE

## 2022-05-18 NOTE — PATIENT INSTRUCTIONS
Please check her blood pressure in a quiet place, resting for 5 minutes, with her feet on the ground annual arm on a table at approximately heart height once a week for the next month and report the blood pressures to me, just to follow up closely your elevated blood pressure today which was primarily a diastolic hypertensive blood pressure 128/95

## 2022-10-31 ENCOUNTER — ANNUAL EXAM (OUTPATIENT)
Dept: OBGYN CLINIC | Facility: MEDICAL CENTER | Age: 62
End: 2022-10-31

## 2022-10-31 VITALS
BODY MASS INDEX: 27.86 KG/M2 | SYSTOLIC BLOOD PRESSURE: 120 MMHG | WEIGHT: 183.8 LBS | DIASTOLIC BLOOD PRESSURE: 74 MMHG | HEIGHT: 68 IN

## 2022-10-31 DIAGNOSIS — R14.0 BLOATING: ICD-10-CM

## 2022-10-31 DIAGNOSIS — R10.31 RLQ ABDOMINAL PAIN: ICD-10-CM

## 2022-10-31 DIAGNOSIS — Z12.11 ENCOUNTER FOR SCREENING FOR MALIGNANT NEOPLASM OF COLON: Primary | ICD-10-CM

## 2022-10-31 RX ORDER — LEVOTHYROXINE SODIUM 25 MCG
TABLET ORAL
COMMUNITY
Start: 2022-10-30

## 2022-10-31 NOTE — PROGRESS NOTES
Rivas Aden was seen today for gynecologic exam     Diagnoses and all orders for this visit:    Encounter for screening for malignant neoplasm of colon  -     Ambulatory referral for colonoscopy; Future    RLQ abdominal pain  -     US pelvis complete non OB; Future    Bloating         Plan  Patient given referral for pelvic ultrasound to evaluate for right adnexal tenderness  Patient given referral for screening colonoscopy  All questions answered  Subjective   Gilma Richardson is a 58 y o   female here for a new patient visit  Patient is complaining of bloating, hasn't gained weight but clothes are tight  Sx's started 8 months ago  Most BM's are normal, some constipation  No pain  Pt reports she was in the ER in  and thought she had appendicitis  Had CT scan which showed a cyst on her kidney  Has had f/u imaging and it has been stable  No pain issues since  Colonoscopy was 10 years ago  No changes in diet/activity  No early satiety  Patient Active Problem List   Diagnosis   • Mixed hyperlipidemia   • Benign essential hypertension   • Hypothyroidism   • Other fatigue   • Tachycardia       Gynecologic History  No LMP recorded  Patient is postmenopausal   The current method of family planning is post menopausal status  History reviewed  No pertinent past medical history  History reviewed  No pertinent surgical history  Family History   Problem Relation Age of Onset   • No Known Problems Sister      Social History     Socioeconomic History   • Marital status:      Spouse name: Not on file   • Number of children: Not on file   • Years of education: Not on file   • Highest education level: Not on file   Occupational History   • Not on file   Tobacco Use   • Smoking status: Never Smoker   • Smokeless tobacco: Never Used   Substance and Sexual Activity   • Alcohol use:  Yes   • Drug use: Never   • Sexual activity: Not on file   Other Topics Concern   • Not on file   Social History Narrative   • Not on file     Social Determinants of Health     Financial Resource Strain: Not on file   Food Insecurity: Not on file   Transportation Needs: Not on file   Physical Activity: Not on file   Stress: Not on file   Social Connections: Not on file   Intimate Partner Violence: Not on file   Housing Stability: Not on file     Allergies   Allergen Reactions   • Oxycodone-Acetaminophen Nausea Only     (Percocet) nauseated, no pain relief   • Propofol Other (See Comments)   • Sulfamethoxazole-Trimethoprim Hives       Current Outpatient Medications:   •  ascorbic acid (VITAMIN C) 1000 MG tablet, Take 1,000 mg by mouth daily, Disp: , Rfl:   •  CALCIUM-MAGNESIUM PO, Take 3 tablets by mouth daily, Disp: , Rfl:   •  Cholecalciferol 1000 units tablet, Take 2 tablets by mouth daily, Disp: , Rfl:   •  cyanocobalamin (VITAMIN B-12) 500 mcg tablet, Take 500 mcg by mouth daily, Disp: , Rfl:   •  Grape Seed Extract 60 MG CAPS, Take 1 capsule by mouth daily, Disp: , Rfl:   •  IODINE-VITAMIN A PO, , Disp: , Rfl:   •  levothyroxine 50 mcg tablet, 25 mcg, Disp: , Rfl:   •  losartan (COZAAR) 100 MG tablet, Take 50 mg by mouth daily, Disp: , Rfl:   •  Potassium 99 MG TABS, Take 1 tablet by mouth daily, Disp: , Rfl:   •  Pyridoxine HCl (VITAMIN B-6) 100 MG TABS, Take 1 tablet by mouth daily, Disp: , Rfl:   •  Red Yeast Rice 600 MG CAPS, Take 1 capsule by mouth daily, Disp: , Rfl:   •  selenium 200 mcg, Take 200 mcg by mouth daily, Disp: , Rfl:   •  Synthroid 25 MCG tablet, , Disp: , Rfl:     Review of Systems  Constitutional :no fever, feels well, no tiredness, no recent weight gain or loss  ENT: no ear ache, no loss of hearing, no nosebleeds or nasal discharge, no sore throat or hoarseness  Cardiovascular: no complaints of slow or fast heart beat, no chest pain, no palpitations, no leg claudication or lower extremity edema    Respiratory: no complaints of shortness of shortness of breath, no MUNOZ  Breasts:no complaints of breast pain, breast lump, or nipple discharge  Gastrointestinal: no complaints of abdominal pain, +constipation, no nausea, vomiting, or diarrhea or bloody stools  Genitourinary : no complaints of dysuria, incontinence, pelvic pain, no dysmenorrhea, vaginal discharge or abnormal vaginal bleeding and as noted in HPI  Musculoskeletal: no complaints of arthralgia, no myalgia, no joint swelling or stiffness, no limb pain or swelling  Integumentary: no complaints of skin rash or lesion, itching or dry skin  Neurological: no complaints of headache, no confusion, no numbness or tingling, no dizziness or fainting     Objective     /74   Ht 5' 8" (1 727 m)   Wt 83 4 kg (183 lb 12 8 oz)   BMI 27 95 kg/m²     General Appears stated age, cooperative, alert normal mood and affect   Psychiatric oriented to person, place and time    Mood and affect normal   Breasts: normal, no dimpling or skin changes noted, sm-mod f-c changes outer quad   Abdomen: soft, non-tender, without masses or organomegaly   Vulva: normal , no lesions   Vagina: normal , no lesions or dryness   Urethra: normal   Urethal meatus normal   Bladder Normal, soft, non-tender and no prolapse or masses appreciated   Cervix: normal, no palpable masses    Uterus: normal , minimal tenderness to palpation, not enlarged, no palpable masses   Adnexa: mild tenderness over right adnexa without fullness or masses; left adnexa with no tenderness or fullness

## 2022-11-30 ENCOUNTER — CONSULT (OUTPATIENT)
Dept: GASTROENTEROLOGY | Facility: MEDICAL CENTER | Age: 62
End: 2022-11-30

## 2022-11-30 VITALS
BODY MASS INDEX: 27.57 KG/M2 | DIASTOLIC BLOOD PRESSURE: 71 MMHG | WEIGHT: 181.3 LBS | TEMPERATURE: 98.1 F | HEART RATE: 71 BPM | SYSTOLIC BLOOD PRESSURE: 131 MMHG

## 2022-11-30 DIAGNOSIS — Z12.11 ENCOUNTER FOR SCREENING FOR MALIGNANT NEOPLASM OF COLON: ICD-10-CM

## 2022-11-30 NOTE — PROGRESS NOTES
[facility] - Outpatient Consultation  Selvin Zimmerman 58 y o  female MRN: [id number]  Encounter: [id number]          ASSESSMENT AND PLAN:      1  Encounter for screening for malignant neoplasm of colon: last colonoscopy was about 10 years ago that she states was normal  No change in bowel habits or alarm symptoms  Unknown family hx  - Ambulatory referral for colonoscopy  - Colonoscopy; Future  - bisacodyl (DULCOLAX) 5 mg EC tablet; Take as instructed by GI office for bowel prep for colonoscopy  Dispense: 2 tablet; Refill: 0  - polyethylene glycol (GOLYTELY) 4000 mL solution; Take as instructed by GI office for bowel prep  Dispense: 4000 mL; Refill: 0    Risks of colonscopy were discussed including but not limited to bleeding, infection, perforation  She understands and agrees to proceed with procedure  She does admit to hx of significant reaction to general anesthesia with hypokalemia  No issues on past colonoscopy  Will do in hospital setting for precaution       ______________________________________________________________________    HPI:  Lucia Dance is a 59 yo female with past medical history including hypothyroidism, hypertension, hyperlipidemia who is here as a new patient for colon cancer screening purposes  She states that she was referred by her gynecologist for colon cancer screening purposes  She has had a colonoscopy in the past   She states this was about 10 years ago, which she states was normal   This was done at a private facility  She does admit that in the summer she had some complaints of abdominal bloating, which has resolved  She denies any change in her bowel habits, melena, hematochezia or other alarm symptoms  Denies upper GI symptoms including dysphagia, acid reflux, postprandial abdominal pain  Unknown family history  She does admit to significant reaction to general anesthesia multiple times in the past with hypokalemia    She is not had any issues during past colonoscopy/twilight anesthesia with propofol  No past abdominal surgery      REVIEW OF SYSTEMS:    CONSTITUTIONAL: Denies any fever, chills, rigors, and weight loss  HEENT: No earache or tinnitus  Denies hearing loss or visual disturbances  CARDIOVASCULAR: No chest pain or palpitations  RESPIRATORY: Denies any cough, hemoptysis, shortness of breath or dyspnea on exertion  GASTROINTESTINAL: As noted in the History of Present Illness  GENITOURINARY: No problems with urination  Denies any hematuria or dysuria  NEUROLOGIC: No dizziness or vertigo, denies headaches  MUSCULOSKELETAL: Denies any muscle or joint pain  SKIN: Denies skin rashes or itching  ENDOCRINE: Denies excessive thirst  Denies intolerance to heat or cold  PSYCHOSOCIAL: Denies depression or anxiety  Denies any recent memory loss  Historical Information   History reviewed  No pertinent past medical history  History reviewed  No pertinent surgical history    Social History   Social History     Substance and Sexual Activity   Alcohol Use Yes     Social History     Substance and Sexual Activity   Drug Use Never     Social History     Tobacco Use   Smoking Status Never   Smokeless Tobacco Never     Family History   Problem Relation Age of Onset   • No Known Problems Sister        Meds/Allergies       Current Outpatient Medications:   •  ascorbic acid (VITAMIN C) 1000 MG tablet  •  bisacodyl (DULCOLAX) 5 mg EC tablet  •  CALCIUM-MAGNESIUM PO  •  Cholecalciferol 1000 units tablet  •  cyanocobalamin (VITAMIN B-12) 500 mcg tablet  •  Grape Seed Extract 60 MG CAPS  •  IODINE-VITAMIN A PO  •  levothyroxine 50 mcg tablet  •  losartan (COZAAR) 100 MG tablet  •  polyethylene glycol (GOLYTELY) 4000 mL solution  •  Potassium 99 MG TABS  •  Pyridoxine HCl (VITAMIN B-6) 100 MG TABS  •  Red Yeast Rice 600 MG CAPS  •  selenium 200 mcg  •  Synthroid 25 MCG tablet    Allergies   Allergen Reactions   • Oxycodone-Acetaminophen Nausea Only (Percocet) nauseated, no pain relief   • Propofol Other (See Comments)   • Sulfamethoxazole-Trimethoprim Hives           Objective     Blood pressure 131/71, pulse 71, temperature 98 1 °F (36 7 °C), weight 82 2 kg (181 lb 4 8 oz)  Body mass index is 27 57 kg/m²  PHYSICAL EXAM:      General Appearance:   Alert, cooperative, no distress   HEENT:   Normocephalic, atraumatic, anicteric      Neck:  Supple, symmetrical, trachea midline   Lungs:   Clear to auscultation bilaterally; no rales, rhonchi or wheezing; respirations unlabored    Heart[de-identified]   Regular rate and rhythm; no murmur, rub, or gallop  Abdomen:   Soft, non-tender, non-distended; normal bowel sounds; no masses, no organomegaly    Genitalia:   Deferred    Rectal:   Deferred    Extremities:  No cyanosis, clubbing or edema        Skin:  No jaundice, rashes, or lesions          Lab Results:   No visits with results within 1 Day(s) from this visit     Latest known visit with results is:   Admission on 05/31/2021, Discharged on 05/31/2021   Component Date Value   • WBC 05/31/2021 5 93    • RBC 05/31/2021 4 63    • Hemoglobin 05/31/2021 14 4    • Hematocrit 05/31/2021 43 1    • MCV 05/31/2021 93    • MCH 05/31/2021 31 1    • MCHC 05/31/2021 33 4    • RDW 05/31/2021 13 1    • MPV 05/31/2021 10 2    • Platelets 04/01/3679 224    • nRBC 05/31/2021 0    • Neutrophils Relative 05/31/2021 67    • Immat GRANS % 05/31/2021 0    • Lymphocytes Relative 05/31/2021 25    • Monocytes Relative 05/31/2021 6    • Eosinophils Relative 05/31/2021 1    • Basophils Relative 05/31/2021 1    • Neutrophils Absolute 05/31/2021 3 94    • Immature Grans Absolute 05/31/2021 0 01    • Lymphocytes Absolute 05/31/2021 1 50    • Monocytes Absolute 05/31/2021 0 35    • Eosinophils Absolute 05/31/2021 0 08    • Basophils Absolute 05/31/2021 0 05    • Sodium 05/31/2021 142    • Potassium 05/31/2021 4 0    • Chloride 05/31/2021 107    • CO2 05/31/2021 25    • ANION GAP 05/31/2021 10    • BUN 05/31/2021 11    • Creatinine 05/31/2021 0 87    • Glucose 05/31/2021 116    • Calcium 05/31/2021 9 7    • AST 05/31/2021 15    • ALT 05/31/2021 21    • Alkaline Phosphatase 05/31/2021 54    • Total Protein 05/31/2021 7 5    • Albumin 05/31/2021 4 1    • Total Bilirubin 05/31/2021 0 38    • eGFR 05/31/2021 72    • Lipase 05/31/2021 78    • Color, UA 05/31/2021 Yellow    • Clarity, UA 05/31/2021 Clear    • pH, UA 05/31/2021 7 0    • Leukocytes, UA 05/31/2021 Negative    • Nitrite, UA 05/31/2021 Negative    • Protein, UA 05/31/2021 Negative    • Glucose, UA 05/31/2021 Negative    • Ketones, UA 05/31/2021 Negative    • Urobilinogen, UA 05/31/2021 0 2    • Bilirubin, UA 05/31/2021 Negative    • Occult Blood, UA 05/31/2021 Small (A)    • Specific Gravity, UA 05/31/2021 1 015    • RBC, UA 05/31/2021 0-1    • WBC, UA 05/31/2021 None Seen    • Epithelial Cells 05/31/2021 None Seen    • Bacteria, UA 05/31/2021 None Seen          Radiology Results:   No results found

## 2022-11-30 NOTE — PATIENT INSTRUCTIONS
Scheduled date of colon (as of today) 2/9/23  Physician performing: Dr Indio Pfeiffer  Location of procedure:  [place]  Instructions given to patient:  golytely/dulcolax  Clearances: na

## 2023-02-08 RX ORDER — SODIUM CHLORIDE 9 MG/ML
125 INJECTION, SOLUTION INTRAVENOUS CONTINUOUS
Status: CANCELLED | OUTPATIENT
Start: 2023-02-08

## 2023-02-09 ENCOUNTER — ANESTHESIA EVENT (OUTPATIENT)
Dept: GASTROENTEROLOGY | Facility: HOSPITAL | Age: 63
End: 2023-02-09

## 2023-02-09 ENCOUNTER — ANESTHESIA (OUTPATIENT)
Dept: GASTROENTEROLOGY | Facility: HOSPITAL | Age: 63
End: 2023-02-09

## 2023-02-09 ENCOUNTER — HOSPITAL ENCOUNTER (OUTPATIENT)
Dept: GASTROENTEROLOGY | Facility: HOSPITAL | Age: 63
Setting detail: OUTPATIENT SURGERY
Discharge: HOME | End: 2023-02-09
Attending: INTERNAL MEDICINE

## 2023-02-09 VITALS
HEART RATE: 59 BPM | RESPIRATION RATE: 17 BRPM | DIASTOLIC BLOOD PRESSURE: 91 MMHG | OXYGEN SATURATION: 99 % | SYSTOLIC BLOOD PRESSURE: 150 MMHG | BODY MASS INDEX: 27.28 KG/M2 | TEMPERATURE: 98.9 F | HEIGHT: 68 IN | WEIGHT: 180 LBS

## 2023-02-09 DIAGNOSIS — R53.83 OTHER FATIGUE: Primary | ICD-10-CM

## 2023-02-09 DIAGNOSIS — Z12.11 ENCOUNTER FOR SCREENING FOR MALIGNANT NEOPLASM OF COLON: ICD-10-CM

## 2023-02-09 LAB — POTASSIUM SERPL-SCNC: 3.2 MMOL/L (ref 3.5–5.3)

## 2023-02-09 RX ORDER — LIDOCAINE HYDROCHLORIDE 20 MG/ML
INJECTION, SOLUTION EPIDURAL; INFILTRATION; INTRACAUDAL; PERINEURAL AS NEEDED
Status: DISCONTINUED | OUTPATIENT
Start: 2023-02-09 | End: 2023-02-09

## 2023-02-09 RX ORDER — PROPOFOL 10 MG/ML
INJECTION, EMULSION INTRAVENOUS AS NEEDED
Status: DISCONTINUED | OUTPATIENT
Start: 2023-02-09 | End: 2023-02-09

## 2023-02-09 RX ORDER — PROPOFOL 10 MG/ML
INJECTION, EMULSION INTRAVENOUS CONTINUOUS PRN
Status: DISCONTINUED | OUTPATIENT
Start: 2023-02-09 | End: 2023-02-09

## 2023-02-09 RX ORDER — SODIUM CHLORIDE 9 MG/ML
125 INJECTION, SOLUTION INTRAVENOUS CONTINUOUS
Status: DISCONTINUED | OUTPATIENT
Start: 2023-02-09 | End: 2023-02-13 | Stop reason: HOSPADM

## 2023-02-09 RX ADMIN — PROPOFOL 120 MCG/KG/MIN: 10 INJECTION, EMULSION INTRAVENOUS at 10:45

## 2023-02-09 RX ADMIN — SODIUM CHLORIDE: 0.9 INJECTION, SOLUTION INTRAVENOUS at 10:38

## 2023-02-09 RX ADMIN — PROPOFOL 110 MG: 10 INJECTION, EMULSION INTRAVENOUS at 10:44

## 2023-02-09 RX ADMIN — LIDOCAINE HYDROCHLORIDE 100 MG: 20 INJECTION, SOLUTION EPIDURAL; INFILTRATION; INTRACAUDAL; PERINEURAL at 10:44

## 2023-02-09 NOTE — ANESTHESIA POSTPROCEDURE EVALUATION
Post-Op Assessment Note    CV Status:  Stable    Pain management: adequate     Mental Status:  Alert and awake   Hydration Status:  Euvolemic   PONV Controlled:  Controlled   Airway Patency:  Patent      Post Op Vitals Reviewed: Yes      Staff: Anesthesiologist         No notable events documented      BP      Temp      Pulse     Resp      SpO2      /91   Pulse 59   Temp 98 9 °F (37 2 °C) (Temporal)   Resp 17   Ht 5' 8" (1 727 m)   Wt 81 6 kg (180 lb)   SpO2 99%   BMI 27 37 kg/m²

## 2023-02-09 NOTE — H&P
History and Physical - [facility]  Monserrat Mendoza 58 y o  female MRN: [id number]                  HPI: Monserrat Mendoza is a 58y o  year old female who presents for colon cancer screening      REVIEW OF SYSTEMS: Per the HPI, and otherwise unremarkable  Historical Information   History reviewed  No pertinent past medical history  History reviewed  No pertinent surgical history    Social History   Social History     Substance and Sexual Activity   Alcohol Use Yes     Social History     Substance and Sexual Activity   Drug Use Never     Social History     Tobacco Use   Smoking Status Never   Smokeless Tobacco Never     Family History   Problem Relation Age of Onset   • No Known Problems Sister        Meds/Allergies       Current Outpatient Medications:   •  levothyroxine 50 mcg tablet  •  ascorbic acid (VITAMIN C) 1000 MG tablet  •  bisacodyl (DULCOLAX) 5 mg EC tablet  •  CALCIUM-MAGNESIUM PO  •  Cholecalciferol 1000 units tablet  •  cyanocobalamin (VITAMIN B-12) 500 mcg tablet  •  Grape Seed Extract 60 MG CAPS  •  IODINE-VITAMIN A PO  •  losartan (COZAAR) 100 MG tablet  •  polyethylene glycol (GOLYTELY) 4000 mL solution  •  Potassium 99 MG TABS  •  Pyridoxine HCl (VITAMIN B-6) 100 MG TABS  •  Red Yeast Rice 600 MG CAPS  •  selenium 200 mcg  •  Synthroid 25 MCG tablet    Current Facility-Administered Medications:   •  sodium chloride 0 9 % infusion, 125 mL/hr, Intravenous, Continuous    Allergies   Allergen Reactions   • Oxycodone-Acetaminophen Nausea Only     (Percocet) nauseated, no pain relief   • Propofol Other (See Comments)   • Sulfamethoxazole-Trimethoprim Hives       Objective     BP (!) 179/96   Pulse 69   Temp 98 9 °F (37 2 °C) (Temporal)   Resp 20   Ht 5' 8" (1 727 m)   Wt 81 6 kg (180 lb)   SpO2 98%   BMI 27 37 kg/m²       PHYSICAL EXAM    Gen: NAD  Head: NCAT  CV: RRR  CHEST: Clear  ABD: soft, NT/ND  EXT: no edema      ASSESSMENT/PLAN:  This is a 58y o  year old female here for colon cancer screening, and she is stable and optimized for her procedure

## 2023-02-09 NOTE — ANESTHESIA PREPROCEDURE EVALUATION
Procedure:  COLONOSCOPY    Relevant Problems   CARDIO   (+) Benign essential hypertension   (+) Mixed hyperlipidemia      ENDO   (+) Hypothyroidism        Physical Exam    Airway    Mallampati score: II  TM Distance: >3 FB  Neck ROM: full     Dental   No notable dental hx     Cardiovascular  Rhythm: regular, Rate: normal, Cardiovascular exam normal    Pulmonary  Pulmonary exam normal Breath sounds clear to auscultation,     Other Findings        Anesthesia Plan  ASA Score- 2     Anesthesia Type- IV sedation with anesthesia with ASA Monitors  Additional Monitors:   Airway Plan:           Plan Factors-    Chart reviewed  Patient summary reviewed  Patient is not a current smoker  Patient instructed to abstain from smoking on day of procedure  Patient did not smoke on day of surgery  Induction- intravenous  Postoperative Plan-     Informed Consent- Anesthetic plan and risks discussed with patient

## 2023-02-10 ENCOUNTER — TELEPHONE (OUTPATIENT)
Dept: GASTROENTEROLOGY | Facility: MEDICAL CENTER | Age: 63
End: 2023-02-10

## 2023-02-10 NOTE — TELEPHONE ENCOUNTER
Spoke with patient  Relayed results and she reports Dr Jeana Duarte is associated with [facility] and will ask him to pull results up as well

## 2023-02-10 NOTE — TELEPHONE ENCOUNTER
----- Message from Rubia Boyce MD sent at 2/9/2023  1:07 PM EST -----  Potassium level is 3 2, slightly low  Patient should follow up with PCP for the result  I was trying to forward info to Dr Monik Leroy, but it seems he is not on the [facility] PCP list, please find out her PCP and send the result to him  Thanks

## 2023-04-05 ENCOUNTER — HOSPITAL ENCOUNTER (OUTPATIENT)
Dept: MAMMOGRAPHY | Facility: MEDICAL CENTER | Age: 63
Discharge: HOME | End: 2023-04-05

## 2023-04-05 VITALS — WEIGHT: 179.9 LBS | BODY MASS INDEX: 27.26 KG/M2 | HEIGHT: 68 IN

## 2023-04-05 DIAGNOSIS — Z12.31 ENCOUNTER FOR SCREENING MAMMOGRAM FOR MALIGNANT NEOPLASM OF BREAST: ICD-10-CM

## 2023-05-18 ENCOUNTER — OFFICE VISIT (OUTPATIENT)
Dept: CARDIOLOGY CLINIC | Facility: CLINIC | Age: 63
End: 2023-05-18

## 2023-05-18 VITALS
HEIGHT: 68 IN | BODY MASS INDEX: 28.34 KG/M2 | HEART RATE: 65 BPM | SYSTOLIC BLOOD PRESSURE: 124 MMHG | WEIGHT: 187 LBS | DIASTOLIC BLOOD PRESSURE: 92 MMHG

## 2023-05-18 DIAGNOSIS — E78.2 MIXED HYPERLIPIDEMIA: ICD-10-CM

## 2023-05-18 DIAGNOSIS — I10 BENIGN ESSENTIAL HYPERTENSION: ICD-10-CM

## 2023-05-18 DIAGNOSIS — R00.0 TACHYCARDIA: Primary | ICD-10-CM

## 2023-05-18 NOTE — PROGRESS NOTES
[facility]iology  Follow up note  Liane Stovall 61 y o  female MRN: [id number]        Problems    1  Tachycardia        2  Mixed hyperlipidemia  POCT ECG      3  Benign essential hypertension  POCT ECG          Impression:    Paroxysmal SVT  Occurred after her COVID vaccinations  Noted on Zio patch after Apple Watch revealed elevated heart rate  No major episodes at this time  Hypertension   Borderline elevated diastolic blood pressure, not consistently, on losartan 100 mg daily  Hyperlipidemia  The 10-year ASCVD risk score (Zainab DIAZ, et al , 2019) is: 5 8%    Values used to calculate the score:      Age: 61 years      Sex: Female      Is Non- : No      Diabetic: No      Tobacco smoker: No      Systolic Blood Pressure: 437 mmHg      Is BP treated: Yes      HDL Cholesterol: 54 mg/dL      Total Cholesterol: 202 mg/dL  Calcium score 125    Plan:    Continues plan for healthy lifestyle modification, red yeast rice, her current low ASCVD risk does not necessarily require statin therapy but I did indicate to her that with increasing age her risk will continue to increase and will eventually recommend statin therapy    HPI:   Liane Stovall is a 61y o  year old female without  Premature family history of CAD, hypertension, mild hypercholesterolemia, hypothyroidism, paroxysmal SVT, returns for a follow-up visit  She has no major cardiovascular complaints  Blood pressure is reasonably controlled  Cholesterol is slightly up, , in the context of a calcium score of 125, and ASCVD risk of about 7%, choosing lifestyle modification at this time with red yeast rice over statin therapy  Review of Systems   Constitutional: Negative for appetite change, diaphoresis, fatigue and fever  Respiratory: Negative for chest tightness, shortness of breath and wheezing  Cardiovascular: Negative for chest pain, palpitations and leg swelling     Gastrointestinal: Negative for abdominal pain and blood in stool  Musculoskeletal: Negative for arthralgias and joint swelling  Skin: Negative for rash  Neurological: Negative for dizziness, syncope and light-headedness  History reviewed  No pertinent past medical history  Social History     Substance and Sexual Activity   Alcohol Use Yes     Social History     Substance and Sexual Activity   Drug Use Never     Social History     Tobacco Use   Smoking Status Never   Smokeless Tobacco Never       Allergies: Allergies   Allergen Reactions   • Oxycodone-Acetaminophen Nausea Only     (Percocet) nauseated, no pain relief   • Propofol Other (See Comments)   • Sulfamethoxazole-Trimethoprim Hives       Medications:     Current Outpatient Medications:   •  ascorbic acid (VITAMIN C) 1000 MG tablet, Take 1,000 mg by mouth daily, Disp: , Rfl:   •  CALCIUM-MAGNESIUM PO, Take 3 tablets by mouth daily, Disp: , Rfl:   •  Cholecalciferol 1000 units tablet, Take 2 tablets by mouth daily, Disp: , Rfl:   •  cyanocobalamin (VITAMIN B-12) 500 mcg tablet, Take 500 mcg by mouth daily, Disp: , Rfl:   •  Grape Seed Extract 60 MG CAPS, Take 1 capsule by mouth daily, Disp: , Rfl:   •  IODINE-VITAMIN A PO, , Disp: , Rfl:   •  levothyroxine 50 mcg tablet, 25 mcg, Disp: , Rfl:   •  losartan (COZAAR) 100 MG tablet, Take 50 mg by mouth daily, Disp: , Rfl:   •  Potassium 99 MG TABS, Take 1 tablet by mouth daily, Disp: , Rfl:   •  Pyridoxine HCl (VITAMIN B-6) 100 MG TABS, Take 1 tablet by mouth daily, Disp: , Rfl:   •  Red Yeast Rice 600 MG CAPS, Take 1 capsule by mouth daily, Disp: , Rfl:   •  selenium 200 mcg, Take 200 mcg by mouth daily, Disp: , Rfl:       Vitals:    05/18/23 1321   BP: 124/92   Pulse: 65     Weight (last 2 days)     Date/Time Weight    05/18/23 1321 84 8 (187)        Physical Exam  Constitutional:       General: She is not in acute distress  Appearance: She is not diaphoretic  HENT:      Head: Normocephalic and atraumatic     Eyes:      General: No scleral "icterus  Conjunctiva/sclera: Conjunctivae normal    Neck:      Vascular: No JVD  Cardiovascular:      Rate and Rhythm: Normal rate and regular rhythm  Heart sounds: Normal heart sounds  No murmur heard  Pulmonary:      Effort: Pulmonary effort is normal  No respiratory distress  Breath sounds: Normal breath sounds  No wheezing, rhonchi or rales  Musculoskeletal:         General: No tenderness  Cervical back: Normal range of motion  Right lower leg: Normal  No edema  Left lower leg: Normal  No edema  Skin:     General: Skin is warm and dry  Laboratory Studies:    Labs personally reviewed    Cardiac testing:     EKG reviewed personally:    11/19-normal sinus rhythm, normal EKG  Results for orders placed or performed in visit on 05/18/23   POCT ECG    Impression    Normal sinus rhythm, normal EKG          stress test  [facility] 2016-normal stress echo      Mayte Kumar MD    Portions of the record may have been created with voice recognition software  Occasional wrong word or \"sound a like\" substitutions may have occurred due to the inherent limitations of voice recognition software  Read the chart carefully and recognize, using context, where substitutions have occurred    "

## 2023-05-30 ENCOUNTER — OFFICE VISIT (OUTPATIENT)
Dept: GASTROENTEROLOGY | Facility: MEDICAL CENTER | Age: 63
End: 2023-05-30

## 2023-05-30 VITALS
WEIGHT: 185.2 LBS | SYSTOLIC BLOOD PRESSURE: 137 MMHG | DIASTOLIC BLOOD PRESSURE: 89 MMHG | TEMPERATURE: 98.2 F | HEART RATE: 67 BPM | BODY MASS INDEX: 28.16 KG/M2

## 2023-05-30 DIAGNOSIS — Z86.010 HISTORY OF COLON POLYPS: ICD-10-CM

## 2023-05-30 DIAGNOSIS — R14.0 BLOATING: Primary | ICD-10-CM

## 2023-05-30 NOTE — PROGRESS NOTES
[facility] - Outpatient Follow-up Note  Skylar Mallory 61 y o  female MRN: [id number]  Encounter: [id number]          ASSESSMENT AND PLAN:      1  Bloating  Patient's bloating resolved after she stopped taking wine  It could be mild irritable bowel syndrome  2  History of colon polyps  Colonoscopy results was reviewed  Patient will repeat colonoscopy in 5 years  ______________________________________________________________________    SUBJECTIVE: 70-year-old female presented for follow-up after colonoscopy  Her most recent colonoscopy was in February 2023, showing 2 small tubular adenoma removed  Recommended to repeat colonoscopy in 5 years  Patient reported regular formed bowel movement with sense of complete evacuation  She reported intermittent bloating symptoms after she drinks wine in the past few weeks  Her symptoms subsided after she stopped taking wine  She denies abdominal pain, nausea or vomiting  She has a CT scan 2 years ago with normal findings  She denies overt symptoms of acid reflux  REVIEW OF SYSTEMS IS OTHERWISE NEGATIVE  Historical Information   History reviewed  No pertinent past medical history  History reviewed  No pertinent surgical history    Social History   Social History     Substance and Sexual Activity   Alcohol Use Yes     Social History     Substance and Sexual Activity   Drug Use Never     Social History     Tobacco Use   Smoking Status Never   Smokeless Tobacco Never     Family History   Problem Relation Age of Onset   • No Known Problems Mother    • Hyperlipidemia Father    • No Known Problems Sister        Meds/Allergies       Current Outpatient Medications:   •  ascorbic acid (VITAMIN C) 1000 MG tablet  •  CALCIUM-MAGNESIUM PO  •  Cholecalciferol 1000 units tablet  •  cyanocobalamin (VITAMIN B-12) 500 mcg tablet  •  Grape Seed Extract 60 MG CAPS  •  IODINE-VITAMIN A PO  •  levothyroxine 50 mcg tablet  •  losartan (COZAAR) 100 MG tablet  •  Potassium 99 MG TABS  •  Pyridoxine HCl (VITAMIN B-6) 100 MG TABS  •  Red Yeast Rice 600 MG CAPS  •  selenium 200 mcg    Allergies   Allergen Reactions   • Oxycodone-Acetaminophen Nausea Only     (Percocet) nauseated, no pain relief   • Propofol Other (See Comments)   • Sulfamethoxazole-Trimethoprim Hives           Objective     Blood pressure 137/89, pulse 67, temperature 98 2 °F (36 8 °C), temperature source Tympanic, weight 84 kg (185 lb 3 2 oz)  Body mass index is 28 16 kg/m²  PHYSICAL EXAM:      General Appearance:   Alert, cooperative, no distress   HEENT:   Normocephalic, atraumatic, anicteric      Neck:  Supple, symmetrical, trachea midline   Lungs:   Clear to auscultation bilaterally; no rales, rhonchi or wheezing; respirations unlabored    Heart[de-identified]   Regular rate and rhythm; no murmur, rub, or gallop  Abdomen:   Soft, non-tender, non-distended; normal bowel sounds; no masses, no organomegaly    Genitalia:   Deferred    Rectal:   Deferred    Extremities:  No cyanosis, clubbing or edema    Pulses:  2+ and symmetric    Skin:  No jaundice, rashes, or lesions    Lymph nodes:  No palpable cervical lymphadenopathy        Lab Results:   No visits with results within 1 Day(s) from this visit     Latest known visit with results is:   Hospital Outpatient Visit on 02/09/2023   Component Date Value   • Potassium 02/09/2023 3 2 (L)    • Case Report 02/09/2023                      Value:Surgical Pathology Report                         Case: [id number]                                   Authorizing Provider:  Stacy Lilly MD              Collected:           02/09/2023 1110              Ordering Location:     [facility]        Received:            02/09/2023 [address] [place] [address] Endoscopy                                                          Pathologist:           Aman Buchanan MD Specimen:    Large Intestine, Hepatic Flexure, polyp x2                                                • Final Diagnosis 02/09/2023                      Value: This result contains rich text formatting which cannot be displayed here  • Additional Information 02/09/2023                      Value: This result contains rich text formatting which cannot be displayed here  • Synoptic Checklist 02/09/2023                      Value:                            COLON/RECTUM POLYP FORM - GI - All Specimens                                                                                     :    Adenoma(s)     • Gross Description 02/09/2023                      Value: This result contains rich text formatting which cannot be displayed here  • Clinical Information 02/09/2023                      Value:58year old female who presents for colon cancer screening  Colonoscopy revealed one sessile, flat polyp measuring 5-9 mm in the hepatic flexure and one 5 mm polyp  Radiology Results:   No results found

## 2023-12-11 ENCOUNTER — ANNUAL EXAM (OUTPATIENT)
Dept: OBGYN CLINIC | Facility: MEDICAL CENTER | Age: 63
End: 2023-12-11
Payer: COMMERCIAL

## 2023-12-11 VITALS
BODY MASS INDEX: 27.95 KG/M2 | WEIGHT: 184.4 LBS | HEIGHT: 68 IN | SYSTOLIC BLOOD PRESSURE: 130 MMHG | DIASTOLIC BLOOD PRESSURE: 80 MMHG

## 2023-12-11 DIAGNOSIS — Z12.31 ENCOUNTER FOR SCREENING MAMMOGRAM FOR MALIGNANT NEOPLASM OF BREAST: ICD-10-CM

## 2023-12-11 DIAGNOSIS — Z01.419 ENCOUNTER FOR WELL WOMAN EXAM WITH ROUTINE GYNECOLOGICAL EXAM: Primary | ICD-10-CM

## 2023-12-11 PROCEDURE — S0612 ANNUAL GYNECOLOGICAL EXAMINA: HCPCS | Performed by: OBSTETRICS & GYNECOLOGY

## 2023-12-11 NOTE — PROGRESS NOTES
ASSESSMENT & PLAN: Tavo Yap was seen today for gynecologic exam.    Diagnoses and all orders for this visit:    Encounter for well woman exam with routine gynecological exam    Encounter for screening mammogram for malignant neoplasm of breast  -     Mammo screening bilateral w 3d & cad; Future  -     Mammo screening bilateral w 3d & cad; Future        1. Routine well woman exam done today  2. Pap and HPV:  The patient's pap is up to date. Pap and cotesting was not done today. Current ASCCP Guidelines reviewed. 3.  Mammogram was ordered  4. Colorectal cancer screening is up to date. 4. The following were reviewed in today's visit: adequate intake of calcium and vitamin D, exercise, and healthy diet. 5. F/u 1 year for next routine GYN exam.      CC:  Annual Gynecologic Examination    HPI: Bulmaro Freed is a 61 y.o.  who presents for annual gynecologic examination. She has the following concerns:  dealing with knee issues; is doing PT. Is now a grandmother. [address] daughter lives in [place]. Health Maintenance:    Patient describes her health as good. Patient has weight concerns. She has limited exercise due to knee problems; wants to start pilates in January,  does PT exercises 4-5 times a week. She does wear her seatbelt routinely. She does perform regular monthly self breast exams. She does feel safe at home. Patients does follow a healthy diet. Patient gets 1 servings of dairy or calcium rich foods a day. Last pap:  nl pap and neg HPV  Last mammogram:   Last colorectal cancer screenin colonoscopy, repeat in 5 years. Patient Active Problem List   Diagnosis    Mixed hyperlipidemia    Benign essential hypertension    Hypothyroidism    Other fatigue    Tachycardia       History reviewed. No pertinent past medical history. History reviewed. No pertinent surgical history.     Past OB/Gyn History:    History of abnormal pap smears: Yes. >10 years ago  Patient is not currently sexually active. heterosexual. Patient's family planning method is post menopausal status. Family History   Problem Relation Age of Onset    No Known Problems Mother     Hyperlipidemia Father     No Known Problems Sister        Social History:  Social History     Socioeconomic History    Marital status:      Spouse name: Not on file    Number of children: Not on file    Years of education: Not on file    Highest education level: Not on file   Occupational History    Not on file   Tobacco Use    Smoking status: Never    Smokeless tobacco: Never   Vaping Use    Vaping Use: Never used   Substance and Sexual Activity    Alcohol use: Yes     Comment: rarely    Drug use: Never    Sexual activity: Not Currently   Other Topics Concern    Not on file   Social History Narrative    Not on file     Social Determinants of Health     Financial Resource Strain: Not on file   Food Insecurity: Not on file   Transportation Needs: Not on file   Physical Activity: Not on file   Stress: Not on file   Social Connections: Not on file   Intimate Partner Violence: Not on file   Housing Stability: Not on file     Presently lives with alone. Patient is currently employed in real estate.     Allergies   Allergen Reactions    Oxycodone-Acetaminophen Nausea Only     (Percocet) nauseated, no pain relief    Propofol Other (See Comments)    Sulfamethoxazole-Trimethoprim Hives         Current Outpatient Medications:     ascorbic acid (VITAMIN C) 1000 MG tablet, Take 1,000 mg by mouth daily, Disp: , Rfl:     CALCIUM-MAGNESIUM PO, Take 3 tablets by mouth daily, Disp: , Rfl:     Cholecalciferol 1000 units tablet, Take 2 tablets by mouth daily, Disp: , Rfl:     cyanocobalamin (VITAMIN B-12) 500 mcg tablet, Take 500 mcg by mouth daily, Disp: , Rfl:     Grape Seed Extract 60 MG CAPS, Take 1 capsule by mouth daily, Disp: , Rfl:     hydrocortisone 2.5 % cream, APPLY TWICE A DAY TO THE EYELIDS AND FACE X2 WEEKS THEN STOP., Disp: , Rfl: IODINE-VITAMIN A PO, , Disp: , Rfl:     levothyroxine 50 mcg tablet, 25 mcg, Disp: , Rfl:     losartan (COZAAR) 100 MG tablet, Take 50 mg by mouth daily, Disp: , Rfl:     Potassium 99 MG TABS, Take 1 tablet by mouth daily, Disp: , Rfl:     Pyridoxine HCl (VITAMIN B-6) 100 MG TABS, Take 1 tablet by mouth daily, Disp: , Rfl:     Red Yeast Rice 600 MG CAPS, Take 1 capsule by mouth daily, Disp: , Rfl:     selenium 200 mcg, Take 200 mcg by mouth daily, Disp: , Rfl:     Review of Systems  Constitutional :no fever, feels well, no tiredness, no recent weight gain or loss  ENT: no ear ache, no loss of hearing, no nosebleeds or nasal discharge, no sore throat or hoarseness. Cardiovascular: no complaints of slow or fast heart beat, no chest pain, no palpitations, no leg claudication or lower extremity edema. Respiratory: no complaints of shortness of shortness of breath, no MUNOZ  Breasts:no complaints of breast pain, breast lump, or nipple discharge  Gastrointestinal: no complaints of abdominal pain, constipation, nausea, vomiting, or diarrhea or bloody stools  Genitourinary : no complaints of dysuria, incontinence, pelvic pain, no dysmenorrhea, vaginal discharge or abnormal vaginal bleeding and as noted in HPI. Musculoskeletal: no complaints of arthralgia, no myalgia, no joint swelling or stiffness, no limb pain or swelling. Integumentary: no complaints of skin rash or lesion, itching or dry skin  Neurological: no complaints of headache, no confusion, no numbness or tingling, no dizziness or fainting    Physical Exam:     /80   Ht 5' 8" (1.727 m)   Wt 83.6 kg (184 lb 6.4 oz)   BMI 28.04 kg/m²     General: appears stated age, cooperative, alert normal mood and affect   Psychiatric oriented to person, place and time. Mood and affect normal   Neck: normal, supple,trachea midline, no masses.   Thyroid: normal, no thyromegaly   Heart: regular rate and rhythm, S1, S2 normal, no murmur, click, rub or gallop   Lungs: clear to auscultation bilaterally, no increased work of breathing or signs of respiratory distress   Breasts: normal, no dimpling or skin changes noted, sm f-c changes outer quad b/l   Abdomen: soft, non-tender, without masses or organomegaly   Vulva: normal , no lesions   Vagina: normal , no lesions or atrophy   Urethra: normal   Urethal meatus normal   Bladder Normal, soft, non-tender and no prolapse or masses appreciated   Cervix: normal, no palpable masses    Uterus: normal , non-tender, not enlarged, no palpable masses   Adnexa: normal, non-tender without fullness or masses   Lymphatic Palpation of lymph nodes in neck, axilla, groin and/or other locations: no lymphadenopathy or masses noted   Skin Normal skin turgor and no rashes.   Palpation of skin and subcutaneous tissue normal.

## 2023-12-11 NOTE — PATIENT INSTRUCTIONS
Thank you for your confidence in our team.   We appreciate you and welcome your feedback. If you receive a survey from us, please take a few moments to let us know how we are doing.    Sincerely,   Deja Cuevas MD

## 2024-03-20 ENCOUNTER — OFFICE VISIT (OUTPATIENT)
Dept: CARDIOLOGY CLINIC | Facility: CLINIC | Age: 64
End: 2024-03-20
Payer: COMMERCIAL

## 2024-03-20 VITALS
WEIGHT: 187.4 LBS | HEIGHT: 68 IN | SYSTOLIC BLOOD PRESSURE: 112 MMHG | BODY MASS INDEX: 28.4 KG/M2 | DIASTOLIC BLOOD PRESSURE: 78 MMHG | HEART RATE: 77 BPM | OXYGEN SATURATION: 97 %

## 2024-03-20 DIAGNOSIS — E78.2 MIXED HYPERLIPIDEMIA: Primary | ICD-10-CM

## 2024-03-20 DIAGNOSIS — I47.10 PSVT (PAROXYSMAL SUPRAVENTRICULAR TACHYCARDIA): ICD-10-CM

## 2024-03-20 DIAGNOSIS — I10 BENIGN ESSENTIAL HYPERTENSION: ICD-10-CM

## 2024-03-20 PROCEDURE — 99214 OFFICE O/P EST MOD 30 MIN: CPT | Performed by: INTERNAL MEDICINE

## 2024-03-20 RX ORDER — ROSUVASTATIN CALCIUM 10 MG/1
10 TABLET, COATED ORAL DAILY
Qty: 30 TABLET | Refills: 6 | Status: SHIPPED | OUTPATIENT
Start: 2024-03-20

## 2024-03-20 NOTE — PROGRESS NOTES
[facility] Cardiology  Follow up note  Faith Del Cid 64 y.o. female MRN: [id number]        Problems    1. Mixed hyperlipidemia  Lipid panel    rosuvastatin (CRESTOR) 10 MG tablet      2. Benign essential hypertension        3. PSVT (paroxysmal supraventricular tachycardia)            Impression:    Paroxysmal SVT  Occurred after her COVID vaccinations  Noted on Zio patch after Apple Watch revealed elevated heart rate  No recurrence of symptoms  Hypertension   Well-controlled  Hyperlipidemia  The 10-year ASCVD risk score (Zainab DIAZ, et al., 2019) is: 5.2%    Values used to calculate the score:      Age: 64 years      Sex: Female      Is Non- : No      Diabetic: No      Tobacco smoker: No      Systolic Blood Pressure: 112 mmHg      Is BP treated: Yes      HDL Cholesterol: 54 mg/dL      Total Cholesterol: 202 mg/dL  Calcium score 125  Previously recommended statin therapy, she was not interested, she is taking red yeast rice, however recently read a preventive health book, and seems to be convinced now that this is a reasonable option.    Plan:    Rosuvastatin 10 mg daily  Stop red yeast rice  Lipids in 3 months  Not appropriate for advanced lipid analysis, she already has hypercholesterolemia and signs of vascular heart disease with elevated calcium score that should be treated regardless of what any further lipid analysis makes indicated.  1 year follow-up    HPI:   Faith Del Cid is a 64 y.o. year old female without  Premature family history of CAD, hypertension, mild hypercholesterolemia, hypothyroidism, paroxysmal SVT, returns for a follow-up visit.  She does not have any cardiac complaints.  Prior LDL was 138.  She takes red yeast rice.  She has not been interested in statin therapy even in the context of a calcium score of 125 in 2020, but recently stumbled upon a preventive health book written by a physician, and asks me to further discuss cholesterol therapy including advanced lipid  analysis.  Blood pressure is well-controlled.  She denies any significant palpitations or recurrence of SVT.  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      Review of Systems   Constitutional:  Negative for appetite change, diaphoresis, fatigue and fever.   Respiratory:  Negative for chest tightness, shortness of breath and wheezing.    Cardiovascular:  Negative for chest pain, palpitations and leg swelling.   Gastrointestinal:  Negative for abdominal pain and blood in stool.   Musculoskeletal:  Negative for arthralgias and joint swelling.   Skin:  Negative for rash.   Neurological:  Negative for dizziness, syncope and light-headedness.       History reviewed. No pertinent past medical history.  Social History     Substance and Sexual Activity   Alcohol Use Yes    Comment: rarely     Social History     Substance and Sexual Activity   Drug Use Never     Social History     Tobacco Use   Smoking Status Never   Smokeless Tobacco Never       Allergies:  Allergies   Allergen Reactions   • Oxycodone-Acetaminophen Nausea Only     (Percocet) nauseated, no pain relief   • Propofol Other (See Comments)   • Sulfamethoxazole-Trimethoprim Hives       Medications:     Current Outpatient Medications:   •  ascorbic acid (VITAMIN C) 1000 MG tablet, Take 1,000 mg by mouth daily, Disp: , Rfl:   •  CALCIUM-MAGNESIUM PO, Take 3 tablets by mouth daily, Disp: , Rfl:   •  Cholecalciferol 1000 units tablet, Take 2 tablets by mouth daily, Disp: , Rfl:   •  cyanocobalamin (VITAMIN B-12) 500 mcg tablet, Take 500 mcg by mouth daily, Disp: , Rfl:   •  Grape Seed Extract 60 MG CAPS, Take 1 capsule by mouth daily, Disp: , Rfl:   •  IODINE-VITAMIN A PO, , Disp: , Rfl:   •  levothyroxine 50 mcg tablet, 25 mcg, Disp: , Rfl:   •  losartan (COZAAR) 100 MG tablet, Take 50 mg by mouth daily, Disp: , Rfl:   •   "Potassium 99 MG TABS, Take 1 tablet by mouth daily, Disp: , Rfl:   •  Pyridoxine HCl (VITAMIN B-6) 100 MG TABS, Take 1 tablet by mouth daily, Disp: , Rfl:   •  rosuvastatin (CRESTOR) 10 MG tablet, Take 1 tablet (10 mg total) by mouth daily, Disp: 30 tablet, Rfl: 6  •  selenium 200 mcg, Take 200 mcg by mouth daily, Disp: , Rfl:   •  hydrocortisone 2.5 % cream, APPLY TWICE A DAY TO THE EYELIDS AND FACE X2 WEEKS THEN STOP. (Patient not taking: Reported on 3/20/2024), Disp: , Rfl:       Vitals:    03/20/24 1533   BP: 112/78   Pulse: 77   SpO2: 97%     Weight (last 2 days)     Date/Time Weight    03/20/24 1533 85 (187.4)        Physical Exam  Constitutional:       General: She is not in acute distress.     Appearance: Normal appearance. She is not diaphoretic.   HENT:      Head: Normocephalic and atraumatic.   Eyes:      General: No scleral icterus.     Conjunctiva/sclera: Conjunctivae normal.   Neck:      Vascular: No JVD.   Cardiovascular:      Rate and Rhythm: Normal rate and regular rhythm.      Heart sounds: Normal heart sounds. No murmur heard.  Pulmonary:      Effort: Pulmonary effort is normal. No respiratory distress.      Breath sounds: Normal breath sounds. No wheezing, rhonchi or rales.   Musculoskeletal:         General: No tenderness.      Right lower leg: Normal. No edema.      Left lower leg: Normal. No edema.   Skin:     General: Skin is warm and dry.   Neurological:      Mental Status: She is alert. Mental status is at baseline.         Laboratory Studies:    Labs personally reviewed    Cardiac testing:     EKG reviewed personally:    11/19-normal sinus rhythm, normal EKG  No results found for this visit on 03/20/24.         stress test  [facility] 2016-normal stress echo      Elia Villagran MD    Portions of the record may have been created with voice recognition software.  Occasional wrong word or \"sound a like\" substitutions may have occurred due to the inherent limitations of voice " recognition software.  Read the chart carefully and recognize, using context, where substitutions have occurred.

## 2024-04-11 ENCOUNTER — HOSPITAL ENCOUNTER (OUTPATIENT)
Dept: MAMMOGRAPHY | Facility: MEDICAL CENTER | Age: 64
Discharge: HOME | End: 2024-04-11
Payer: COMMERCIAL

## 2024-04-11 VITALS — BODY MASS INDEX: 28.34 KG/M2 | HEIGHT: 68 IN | WEIGHT: 187 LBS

## 2024-04-11 DIAGNOSIS — Z12.31 ENCOUNTER FOR SCREENING MAMMOGRAM FOR MALIGNANT NEOPLASM OF BREAST: ICD-10-CM

## 2024-04-11 PROCEDURE — 77067 SCR MAMMO BI INCL CAD: CPT

## 2024-04-11 PROCEDURE — 77063 BREAST TOMOSYNTHESIS BI: CPT

## 2024-04-13 DIAGNOSIS — E78.2 MIXED HYPERLIPIDEMIA: ICD-10-CM

## 2024-04-15 ENCOUNTER — TELEPHONE (OUTPATIENT)
Dept: CARDIOLOGY CLINIC | Facility: CLINIC | Age: 64
End: 2024-04-15

## 2024-04-15 RX ORDER — ROSUVASTATIN CALCIUM 10 MG/1
10 TABLET, COATED ORAL DAILY
Qty: 90 TABLET | Refills: 1 | Status: SHIPPED | OUTPATIENT
Start: 2024-04-15

## 2024-04-15 NOTE — TELEPHONE ENCOUNTER
Left message for bishop regarding that her medication was approved and sent to pharmacy but relayed message per Christina Hernandez - Patient needs updated blood work and has previously placed orders. Please contact patient to go for labs. Told pt if she had any questions to contact the main office.

## 2024-04-19 LAB
CHOLEST SERPL-MCNC: 120 MG/DL
CHOLEST/HDLC SERPL: 2.3 {RATIO}
HDLC SERPL-MCNC: 52 MG/DL (ref 23–92)
LDLC SERPL CALC-MCNC: 46 MG/DL
NONHDLC SERPL-MCNC: 68 MG/DL
TRIGL SERPL-MCNC: 108 MG/DL

## 2024-10-13 DIAGNOSIS — E78.2 MIXED HYPERLIPIDEMIA: ICD-10-CM

## 2024-10-14 RX ORDER — ROSUVASTATIN CALCIUM 10 MG/1
10 TABLET, COATED ORAL DAILY
Qty: 90 TABLET | Refills: 1 | Status: SHIPPED | OUTPATIENT
Start: 2024-10-14

## 2024-12-16 ENCOUNTER — ANNUAL EXAM (OUTPATIENT)
Dept: OBGYN CLINIC | Facility: MEDICAL CENTER | Age: 64
End: 2024-12-16
Payer: COMMERCIAL

## 2024-12-16 VITALS
HEIGHT: 68 IN | WEIGHT: 181.1 LBS | DIASTOLIC BLOOD PRESSURE: 72 MMHG | BODY MASS INDEX: 27.45 KG/M2 | SYSTOLIC BLOOD PRESSURE: 118 MMHG

## 2024-12-16 DIAGNOSIS — Z01.419 ENCNTR FOR GYN EXAM (GENERAL) (ROUTINE) W/O ABN FINDINGS: Primary | ICD-10-CM

## 2024-12-16 PROCEDURE — S0612 ANNUAL GYNECOLOGICAL EXAMINA: HCPCS | Performed by: OBSTETRICS & GYNECOLOGY

## 2024-12-16 NOTE — PROGRESS NOTES
ASSESSMENT & PLAN: Faith was seen today for gynecologic exam.    Diagnoses and all orders for this visit:    Encntr for gyn exam (general) (routine) w/o abn findings        1.  Routine well woman exam done today  2.  Pap and HPV:  The patient's pap is up to date.  Pap and cotesting was not done today.  Current ASCCP Guidelines reviewed.  3.  Mammogram has been scheduled.  4.  Colorectal cancer screening is up to date.    4. The following were reviewed in today's visit: adequate intake of calcium and vitamin D, exercise, and healthy diet.  5. F/u 1 year for next routine GYN exam.      CC:  Annual Gynecologic Examination    HPI: Faith Del Cid is a 64 y.o.  who presents for annual gynecologic examination.  She has the following concerns:  no gyn c/o.  Still dealing with knee issues, wears a brace.  Has h/o fracture in her foot.      Health Maintenance:    Patient describes her health as good.  Patient has weight concerns.  She exercises 1 days per week with pilates.    She does wear her seatbelt routinely.    She does perform regular monthly self breast exams.    She does feel safe at home.   Patients does follow a healthy, primarily vegetarian diet.  Patient gets 1 servings of dairy or calcium rich foods a day.    Last pap:  nl pap and neg HPV   Last mammogram:   Last colorectal cancer screening: colonscopy   Last DEXA: n/a     Patient Active Problem List   Diagnosis    Mixed hyperlipidemia    Benign essential hypertension    Hypothyroidism    Other fatigue    PSVT (paroxysmal supraventricular tachycardia) (HCC)       History reviewed. No pertinent past medical history.    History reviewed. No pertinent surgical history.    Past OB/Gyn History:    History of abnormal pap smears: Yes. S/p colpo >10 years ago.  Reports severe pain reaction when she had the colposcopies in the past.  Patient is not currently sexually active.  heterosexual. Patient's family planning method is post menopausal  status.    Family History   Problem Relation Age of Onset    No Known Problems Mother     Hyperlipidemia Father     No Known Problems Sister     No Known Problems Maternal Grandmother     No Known Problems Maternal Grandfather     No Known Problems Paternal Grandmother     No Known Problems Paternal Grandfather     Breast cancer Neg Hx        Social History:  Social History     Socioeconomic History    Marital status:      Spouse name: Not on file    Number of children: Not on file    Years of education: Not on file    Highest education level: Not on file   Occupational History    Not on file   Tobacco Use    Smoking status: Never    Smokeless tobacco: Never   Vaping Use    Vaping status: Never Used   Substance and Sexual Activity    Alcohol use: Yes     Comment: rarely    Drug use: Never    Sexual activity: Not Currently   Other Topics Concern    Not on file   Social History Narrative    Not on file     Social Drivers of Health     Financial Resource Strain: Low Risk  (6/3/2024)    Received from [facility]    Overall Financial Resource Strain (CARDIA)     Difficulty of Paying Living Expenses: Not hard at all   Food Insecurity: No Food Insecurity (6/3/2024)    Received from [facility]    Hunger Vital Sign     Worried About Running Out of Food in the Last Year: Never true     Ran Out of Food in the Last Year: Never true   Transportation Needs: No Transportation Needs (6/3/2024)    Received from [facility]    PRAPARE - Transportation     Lack of Transportation (Medical): No     Lack of Transportation (Non-Medical): No   Physical Activity: Not on file   Stress: No Stress Concern Present (6/3/2024)    Received from [facility]    [employer] [place] [employer] - Occupational Stress Questionnaire     Feeling of Stress : Not at all   Social Connections: Feeling Socially Integrated (6/3/2024)    Received from [facility]    OASIS : Social Isolation     How often do you feel lonely or isolated from those around you?: Never   Intimate Partner Violence: Not At Risk (6/3/2024)    [facility]    Humiliation, Afraid, Rape, and Kick questionnaire     Fear of Current or Ex-Partner: No     Emotionally Abused: No     Physically Abused: No     Sexually Abused: No   Housing Stability: Low Risk  (6/3/2024)    [facility]    Housing Stability Vital Sign     Unable to Pay for Housing in the Last Year: No     Number of Times Moved in the Last Year: 0     Homeless in the Last Year: No     Presently lives alone.  Patient is currently employed in real estate.    Allergies   Allergen Reactions    Oxycodone-Acetaminophen Nausea Only and Other (See Comments)     (Percocet) nauseated, no pain relief    (Percocet) nauseated, no pain relief   (Percocet) nauseated, no pain relief    Propofol Other (See Comments)    Sulfamethoxazole-Trimethoprim Hives         Current Outpatient Medications:     CALCIUM-MAGNESIUM PO, Take 3 tablets by mouth daily, Disp: , Rfl:     Cholecalciferol 1000 units tablet, Take 2 tablets by mouth daily, Disp: , Rfl:     cyanocobalamin (VITAMIN B-12) 500 mcg tablet, Take 500 mcg by mouth daily, Disp: , Rfl:     Grape Seed Extract 60 MG CAPS, Take 1 capsule by mouth daily, Disp: , Rfl:     IODINE-VITAMIN A PO, , Disp: , Rfl:     levothyroxine 50 mcg tablet, 25 mcg, Disp: , Rfl:     losartan (COZAAR) 100 MG tablet, Take 50 mg by mouth daily, Disp: , Rfl:     Potassium 99 MG TABS, Take 1 tablet by mouth daily, Disp: , Rfl:     Pyridoxine HCl (VITAMIN B-6) 100 MG TABS, Take 1 tablet by mouth daily, Disp: , Rfl:     rosuvastatin (CRESTOR) 10 MG tablet, TAKE 1 TABLET BY MOUTH EVERY DAY, Disp: 90 tablet, Rfl: 1    selenium 200 mcg, Take 200 mcg by mouth daily, Disp: , Rfl:     TURMERIC PO, Take by mouth daily, Disp: , Rfl:     ZINC CITRATE PO, Take 1 tablet by mouth,  "Disp: , Rfl:     Review of Systems  Constitutional :no fever, feels well, no tiredness, no recent weight gain or loss  ENT: no ear ache, no loss of hearing, no nosebleeds or nasal discharge, no sore throat or hoarseness.  Cardiovascular: no complaints of slow or fast heart beat, no chest pain, no palpitations, no leg claudication or lower extremity edema.  Respiratory: no complaints of shortness of shortness of breath, no MUNOZ  Breasts:no complaints of breast pain, breast lump, or nipple discharge  Gastrointestinal: no complaints of abdominal pain, constipation, nausea, vomiting, or diarrhea or bloody stools  Genitourinary : no complaints of dysuria, incontinence, pelvic pain, no dysmenorrhea, vaginal discharge or abnormal vaginal bleeding and as noted in HPI.  Musculoskeletal: no complaints of arthralgia, no myalgia, no joint swelling or stiffness, no limb pain or swelling.  Integumentary: no complaints of skin rash or lesion, itching or dry skin  Neurological: no complaints of headache, no confusion, no numbness or tingling, no dizziness or fainting    Physical Exam:     /72   Ht 5' 8\" (1.727 m)   Wt 82.1 kg (181 lb 1.6 oz)   BMI 27.54 kg/m²     General: appears stated age, cooperative, alert normal mood and affect   Psychiatric oriented to person, place and time.  Mood and affect normal   Neck: normal, supple,trachea midline, no masses.  Thyroid: normal, no thyromegaly   Heart: regular rate and rhythm, S1, S2 normal, no murmur, click, rub or gallop   Lungs: clear to auscultation bilaterally, no increased work of breathing or signs of respiratory distress   Breasts: normal, no dimpling or skin changes noted   Abdomen: soft, non-tender, without masses or organomegaly   Vulva: normal , no lesions   Vagina: normal , no lesions or atrophy   Urethra: normal   Urethal meatus normal   Bladder Normal, soft, non-tender and no prolapse or masses appreciated   Cervix: normal, no palpable masses    Uterus: normal , " non-tender, not enlarged, no palpable masses   Adnexa: normal, non-tender without fullness or masses   Lymphatic Palpation of lymph nodes in neck, axilla, groin and/or other locations: no lymphadenopathy or masses noted   Skin Normal skin turgor and no rashes.  Palpation of skin and subcutaneous tissue normal.

## 2025-03-06 DIAGNOSIS — E78.2 MIXED HYPERLIPIDEMIA: ICD-10-CM

## 2025-03-07 RX ORDER — ROSUVASTATIN CALCIUM 10 MG/1
10 TABLET, COATED ORAL DAILY
Qty: 90 TABLET | Refills: 1 | Status: SHIPPED | OUTPATIENT
Start: 2025-03-07

## 2025-04-14 ENCOUNTER — HOSPITAL ENCOUNTER (OUTPATIENT)
Dept: MAMMOGRAPHY | Facility: MEDICAL CENTER | Age: 65
Discharge: HOME | End: 2025-04-14
Payer: COMMERCIAL

## 2025-04-14 VITALS — BODY MASS INDEX: 27.43 KG/M2 | WEIGHT: 181 LBS | HEIGHT: 68 IN

## 2025-04-14 DIAGNOSIS — Z12.31 ENCOUNTER FOR SCREENING MAMMOGRAM FOR MALIGNANT NEOPLASM OF BREAST: ICD-10-CM

## 2025-04-14 PROCEDURE — 77067 SCR MAMMO BI INCL CAD: CPT

## 2025-04-14 PROCEDURE — 77063 BREAST TOMOSYNTHESIS BI: CPT

## 2025-04-23 ENCOUNTER — RESULTS FOLLOW-UP (OUTPATIENT)
Dept: OBGYN CLINIC | Facility: MEDICAL CENTER | Age: 65
End: 2025-04-23

## 2025-05-15 ENCOUNTER — OFFICE VISIT (OUTPATIENT)
Dept: CARDIOLOGY CLINIC | Facility: CLINIC | Age: 65
End: 2025-05-15
Payer: COMMERCIAL

## 2025-05-15 VITALS
HEIGHT: 68 IN | DIASTOLIC BLOOD PRESSURE: 88 MMHG | BODY MASS INDEX: 27.43 KG/M2 | WEIGHT: 181 LBS | SYSTOLIC BLOOD PRESSURE: 148 MMHG | HEART RATE: 60 BPM

## 2025-05-15 DIAGNOSIS — I10 BENIGN ESSENTIAL HYPERTENSION: Primary | ICD-10-CM

## 2025-05-15 DIAGNOSIS — E78.2 MIXED HYPERLIPIDEMIA: ICD-10-CM

## 2025-05-15 DIAGNOSIS — I47.10 PSVT (PAROXYSMAL SUPRAVENTRICULAR TACHYCARDIA) (HCC): ICD-10-CM

## 2025-05-15 DIAGNOSIS — R93.1 AGATSTON CAC SCORE 100-199: ICD-10-CM

## 2025-05-15 LAB
ATRIAL RATE: 60 BPM
P AXIS: 11 DEGREES
PR INTERVAL: 150 MS
QRS AXIS: 10 DEGREES
QRSD INTERVAL: 94 MS
QT INTERVAL: 408 MS
QTC INTERVAL: 408 MS
T WAVE AXIS: 18 DEGREES
VENTRICULAR RATE: 60 BPM

## 2025-05-15 PROCEDURE — 99214 OFFICE O/P EST MOD 30 MIN: CPT | Performed by: INTERNAL MEDICINE

## 2025-05-15 PROCEDURE — 93000 ELECTROCARDIOGRAM COMPLETE: CPT | Performed by: INTERNAL MEDICINE

## 2025-05-15 NOTE — PROGRESS NOTES
[facility] Cardiology  Follow up note  Faith Del Cid 65 y.o. female MRN: [id number]    Assessment & Plan  Benign essential hypertension  Slightly high today, a lot of stresses currently, moving apartments as well  Asked her to keep an eye on her home blood pressure, if she trends greater than 135/85 over the next couple weeks, asked her to raise her losartan up to 100 mg daily.  Mixed hyperlipidemia  Cholesterol was fantastic in response to rosuvastatin 10 mg last year with a tremendous drop in LDL from 125 down to 46  PSVT (paroxysmal supraventricular tachycardia) (HCC)  Occurred after COVID vaccinations  Occasional episodes noted on Apple Watch, also on prior Zio patch  No major complaints of symptoms today  Agatston CAC score 100-199  Calcium score of 125, combined with family history  I would like her to get an additional lipid marker called lipoprotein a       Plan:    Annual follow-up  Lipid panels ordered  Check Lipobay    HPI:   Faith Del Cid is a 65 y.o. year old female without  Premature family history of CAD, hypertension, mild hypercholesterolemia, hypothyroidism, paroxysmal SVT, returns for a follow-up visit.      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  She has no major cardiac symptoms for me today.  She denies any palpitations currently      Review of Systems   Constitutional:  Negative for appetite change, diaphoresis, fatigue and fever.   Respiratory:  Negative for chest tightness, shortness of breath and wheezing.    Cardiovascular:  Negative for chest pain, palpitations and leg swelling.   Gastrointestinal:  Negative for abdominal pain and  blood in stool.   Musculoskeletal:  Negative for arthralgias and joint swelling.   Skin:  Negative for rash.   Neurological:  Negative for dizziness, syncope and light-headedness.       No past medical history on file.  Social History     Substance and Sexual Activity   Alcohol Use Yes    Comment: rarely     Social History     Substance and Sexual Activity   Drug Use Never     Social History     Tobacco Use   Smoking Status Never   Smokeless Tobacco Never       Allergies:  Allergies   Allergen Reactions   • Oxycodone-Acetaminophen Nausea Only and Other (See Comments)     (Percocet) nauseated, no pain relief    (Percocet) nauseated, no pain relief   (Percocet) nauseated, no pain relief   • Propofol Other (See Comments)   • Sulfamethoxazole-Trimethoprim Hives       Medications:     Current Outpatient Medications:   •  CALCIUM-MAGNESIUM PO, Take 3 tablets by mouth in the morning., Disp: , Rfl:   •  Cholecalciferol 1000 units tablet, Take 2 tablets by mouth in the morning., Disp: , Rfl:   •  cyanocobalamin (VITAMIN B-12) 500 mcg tablet, Take 500 mcg by mouth in the morning., Disp: , Rfl:   •  Grape Seed Extract 60 MG CAPS, Take 1 capsule by mouth in the morning., Disp: , Rfl:   •  IODINE-VITAMIN A PO, , Disp: , Rfl:   •  levothyroxine 50 mcg tablet, 25 mcg, Disp: , Rfl:   •  losartan (COZAAR) 100 MG tablet, Take 50 mg by mouth in the morning., Disp: , Rfl:   •  Potassium 99 MG TABS, Take 1 tablet by mouth in the morning., Disp: , Rfl:   •  Pyridoxine HCl (VITAMIN B-6) 100 MG TABS, Take 1 tablet by mouth in the morning., Disp: , Rfl:   •  rosuvastatin (CRESTOR) 10 MG tablet, Take 1 tablet (10 mg total) by mouth daily, Disp: 90 tablet, Rfl: 1  •  selenium 200 mcg, Take 200 mcg by mouth in the morning., Disp: , Rfl:   •  TURMERIC PO, Take by mouth in the morning., Disp: , Rfl:   •  ZINC CITRATE PO, Take 1 tablet by mouth, Disp: , Rfl:       Vitals:    05/15/25 0925   BP: 148/88   Pulse: 60     Weight (last 2 days)      "Date/Time Weight    05/15/25 0925 82.1 (181)        Physical Exam  Constitutional:       General: She is not in acute distress.     Appearance: Normal appearance. She is not diaphoretic.   HENT:      Head: Normocephalic and atraumatic.     Eyes:      General: No scleral icterus.     Conjunctiva/sclera: Conjunctivae normal.     Neck:      Vascular: No JVD.     Cardiovascular:      Rate and Rhythm: Normal rate and regular rhythm.      Heart sounds: Normal heart sounds. No murmur heard.  Pulmonary:      Effort: Pulmonary effort is normal. No respiratory distress.      Breath sounds: Normal breath sounds. No wheezing, rhonchi or rales.     Musculoskeletal:         General: No tenderness.      Right lower leg: Normal. No edema.      Left lower leg: Normal. No edema.     Skin:     General: Skin is warm and dry.     Neurological:      Mental Status: She is alert. Mental status is at baseline.         Laboratory Studies:    Labs personally reviewed    Cardiac testing:     EKG reviewed personally:    11/19-normal sinus rhythm, normal EKG  Results for orders placed or performed in visit on 05/15/25   POCT ECG   Result Value    Ventricular Rate 60    Atrial Rate 60    [place] Interval 150    QRSD Interval 94    QT Interval 408    QTC Interval 408    P Axis 11    QRS Axis 10    T Wave Axis 18    Narrative    Normal sinus rhythm  Normal ECG  No previous ECGs available  Confirmed by Elia Villagran ([id number]) on 5/15/2025 10:01:33 AM            stress test  [facility] 2016-normal stress echo      Elia Villagran MD    Portions of the record may have been created with voice recognition software.  Occasional wrong word or \"sound a like\" substitutions may have occurred due to the inherent limitations of voice recognition software.  Read the chart carefully and recognize, using context, where substitutions have occurred.  "

## 2025-05-15 NOTE — ASSESSMENT & PLAN NOTE
Calcium score of 125, combined with family history  I would like her to get an additional lipid marker called lipoprotein a  
Cholesterol was fantastic in response to rosuvastatin 10 mg last year with a tremendous drop in LDL from 125 down to 46  
Occurred after COVID vaccinations  Occasional episodes noted on Apple Watch, also on prior Zio patch  No major complaints of symptoms today  
Slightly high today, a lot of stresses currently, moving apartments as well  Asked her to keep an eye on her home blood pressure, if she trends greater than 135/85 over the next couple weeks, asked her to raise her losartan up to 100 mg daily.  
- - -

## 2025-06-02 ENCOUNTER — TELEPHONE (OUTPATIENT)
Age: 65
End: 2025-06-02

## 2025-06-02 NOTE — TELEPHONE ENCOUNTER
Caller: Faith Del Cid    Doctor: Dr. Villagran    Reason for call: pt calling again to see if Dr. Villagran has seen the blood pressure chart she has been keeping. Please return call to patient ASAP    Call back#: [phone]      Chart is in large white envelope with Dr. Villagran name on it. Dropped of on 5/22/25

## 2025-06-02 NOTE — TELEPHONE ENCOUNTER
Patient Faith [phone] established patient of , contacting [facility] requesting the blood work orders for the lipid panel and the lipo protein to be faxed to [facility].    Fax number: [phone]    Patient is currently at the lab now for her blood work.    Thank you.

## 2025-06-02 NOTE — TELEPHONE ENCOUNTER
LVM to call office back in regards to BP chart. I never received a chart and neither did . Asked when and where she dropped it off

## 2025-06-02 NOTE — TELEPHONE ENCOUNTER
Patient Faith [phone] established patient of Dr. Villagran, contacting [facility] inquiring if Dr. Villagran had a chance to review the blood pressure chart she dropped off in the office.    Patient requesting a telephone call back to discuss blood pressure reading.

## 2025-06-03 NOTE — TELEPHONE ENCOUNTER
Caller: Faith Del Cid     Doctor: Dr. Villagran    Reason for call: Patient returned call. Attempted to connect caller with office but unsuccessful. Patient states that she dropped off envelope at [facility] with  on May 22, 2025     Call back#: [phone]

## 2025-06-03 NOTE — TELEPHONE ENCOUNTER
LVM to call office back in regards to BP chart. [facility] office does not seem to have this chart or remember anyone dropping this off. Ask patient to let us know if she dropped it off at [facility] location.

## 2025-06-04 ENCOUNTER — RESULTS FOLLOW-UP (OUTPATIENT)
Dept: CARDIOLOGY CLINIC | Facility: CLINIC | Age: 65
End: 2025-06-04

## 2025-06-04 LAB — LPA SERPL-SCNC: 17 NMOL/L

## 2025-06-04 NOTE — TELEPHONE ENCOUNTER
Please relay message to patient when she calls back that we do not have BP chart       Left message that we do not have BP chart

## 2025-06-04 NOTE — TELEPHONE ENCOUNTER
Spoke with patient, asking to speak with Lanny, follow up on previous calls.    Inquiring if she can email her BP log to someone? Advised she can upload it into her [employer], which has since been done.    Did forward 2 messages with BP readings and a question about the differences in readings between right and left arms, is this a concern?